# Patient Record
Sex: MALE | Race: OTHER | Employment: FULL TIME | URBAN - METROPOLITAN AREA
[De-identification: names, ages, dates, MRNs, and addresses within clinical notes are randomized per-mention and may not be internally consistent; named-entity substitution may affect disease eponyms.]

---

## 2022-05-28 ENCOUNTER — HOSPITAL ENCOUNTER (EMERGENCY)
Age: 52
Discharge: HOME | End: 2022-05-28
Payer: OTHER GOVERNMENT

## 2022-05-28 ENCOUNTER — APPOINTMENT (OUTPATIENT)
Dept: GENERAL RADIOLOGY | Age: 52
End: 2022-05-28
Payer: OTHER GOVERNMENT

## 2022-05-28 VITALS
OXYGEN SATURATION: 98 % | TEMPERATURE: 98.6 F | DIASTOLIC BLOOD PRESSURE: 80 MMHG | HEART RATE: 74 BPM | SYSTOLIC BLOOD PRESSURE: 149 MMHG

## 2022-05-28 DIAGNOSIS — S86.911A KNEE STRAIN, RIGHT, INITIAL ENCOUNTER: Primary | ICD-10-CM

## 2022-05-28 PROCEDURE — 73560 X-RAY EXAM OF KNEE 1 OR 2: CPT

## 2022-05-28 PROCEDURE — 99283 EMERGENCY DEPT VISIT LOW MDM: CPT

## 2022-05-28 RX ORDER — IBUPROFEN 600 MG/1
600 TABLET ORAL 3 TIMES DAILY PRN
Qty: 30 TABLET | Refills: 0 | Status: SHIPPED | OUTPATIENT
Start: 2022-05-28 | End: 2023-04-18

## 2022-05-28 ASSESSMENT — ENCOUNTER SYMPTOMS
SORE THROAT: 0
ABDOMINAL PAIN: 0
VOMITING: 0
BACK PAIN: 0
SHORTNESS OF BREATH: 0
NAUSEA: 0

## 2022-05-28 NOTE — LETTER
[facility] Emergency Department  [address] [place] [address]  Phone: [phone]         May 28, 2022     Patient: Felicia Flores   YOB: 1970   Date of Visit: 5/28/2022       To Whom It May Concern:    Brittany Phillips was seen and treated in our emergency department on 5/28/2022. Please excuse him from work on 5/27 and 5/28. The following work duties are recommended. He may return to work on 6/1/22 with the following restrictions: must wear splint/sling     Treatment and work recommendations given by the emergency department are initial emergency measures only, and follow up should be arranged as soon as possible with the company's occupational health provider* or the specialist to whom the worker was referred. *If the company does not have an occupational health provider, follow up should be at Chavez Davis MD  [address] [place] [address].   [address]    Schedule an appointment as soon as possible for a visit       [facility]  [address] [address],[address] [address]    Schedule an appointment as soon as possible for a visit             Sincerely,        Florentino Marrero PA-C        Signature:__________________________________

## 2022-05-28 NOTE — ED TRIAGE NOTES
Landry Castillo is a 54 y.o. male brought himself to the ER for eval of lower back pain and right knee pain following a fall off a truck at work last week. The patient states his pain is a 9/10 and its hard to walk on his right knee. The patient is alert and oriented with an open and patent with a normal respiratory effort.

## 2022-05-28 NOTE — ED NOTES
Pt refusing crutches at this time. ED tech at bedside to apply knee immobilizer.        Marck Pinto RN  05/28/22 0139

## 2022-05-28 NOTE — ED PROVIDER NOTES
[address]      Pt Name: Jorge Robles  MRN: [id number]  [place] 1970  Date of evaluation: 5/28/2022  Provider: Cristela Gomez PA-C    This patient was not seen and evaluated by the attending physician No att. providers found. CHIEF COMPLAINT      Chief Complaint: knee pain      HISTORYOF PRESENT ILLNESS  (Location/Symptom, Timing/Onset, Context/Setting, Quality, Duration, Modifying Factors, Severity.)   Brittany Good is a 54 y.o. male who presents to the emergency department complaining of knee pain. The patient reports about a week ago while at work he fell getting out of a truck. He says he twisted his right knee. Since then he has had right knee pain that radiates down his leg. Pain is constant and worse with weightbearing. He has been using ibuprofen and Tylenol with some improvement. Nursing Notes were reviewed and I agree. REVIEW OF SYSTEMS    (2-9 systems for level 4, 10 or more forlevel 5)     Review of Systems   Constitutional: Negative for chills and fever. HENT: Negative for sore throat. Respiratory: Negative for shortness of breath. Cardiovascular: Negative for chest pain. Gastrointestinal: Negative for abdominal pain, nausea and vomiting. Genitourinary: Negative for difficulty urinating and dysuria. Musculoskeletal: Positive for arthralgias. Negative for back pain. Skin: Negative for rash. Neurological: Negative for light-headedness and headaches. Psychiatric/Behavioral: The patient is not nervous/anxious. All other systems reviewed and are negative. Positives and Pertinent negatives as per HPI. Except as noted above the remainder of the review of systems was reviewed and negative. PAST MEDICALHISTORY   History reviewed. No pertinent past medical history. SURGICAL HISTORY     History reviewed. No pertinent surgical history.     CURRENT MEDICATIONS Previous Medications    No medications on file       ALLERGIES     Shrimp flavor and Shellfish-derived products    FAMILY HISTORY     History reviewed. No pertinent family history. No family status information on file. SOCIAL HISTORY    reports that he has never smoked. He has never used smokeless tobacco. He reports that he does not drink alcohol and does not use drugs. PHYSICAL EXAM    (up to 7 for level 4, 8 or more for level 5)     ED Triage Vitals [05/28/22 1550]   BP Temp Temp Source Heart Rate Resp SpO2 Height Weight   (!) 149/80 98.6 °F (37 °C) Oral 74 -- 98 % -- --       Physical Exam  Vitals and nursing note reviewed. Constitutional:       General: He is not in acute distress. Appearance: He is well-developed. HENT:      Head: Normocephalic and atraumatic. Pulmonary:      Effort: Pulmonary effort is normal. No respiratory distress. Musculoskeletal:         General: Normal range of motion. Cervical back: Neck supple. Comments: Patient has moderate diffuse tenderness about the right knee without any specific bony point tenderness. There is no effusion. No erythema or warmth. He has normal flexion but reduced extension secondary to pain. He has pain with any stress of the joint but no laxity. Skin:     General: Skin is warm and dry. Neurological:      Mental Status: He is alert and oriented to person, place, and time. Psychiatric:         Behavior: Behavior normal.            DIAGNOSTIC RESULTS     When ordered, EKGs are interpreted by the Emergency Department Physician in the absence of a cardiologist. Please see their note for interpretation of EKG    RADIOLOGY:         Interpretation per the Radiologist below, if available at the time of this note:    XR KNEE RIGHT (1-2 VIEWS)   Final Result   No acute finding of the right knee. LABS:  Labs Reviewed - No data to display    When ordered, only abnormal lab results are displayed.  All other labs are within normal range or not returned as of the dictation. EMERGENCY DEPARTMENT COURSE and DIFFERENTIAL DIAGNOSIS/MDM:   Vitals:    Vitals:    05/28/22 1550   BP: (!) 149/80   Pulse: 74   Temp: 98.6 °F (37 °C)   TempSrc: Oral   SpO2: 98%        I have evaluated this patient. My supervising physician was available for consultation. Patient is neurovascular intact. He is afebrile. His exam is reassuring and x-ray is negative. He was reassured. He was given knee immobilizer. He refused crutches. He was given orthopedic referral for outpatient management. He was also given a work excuse allowing him to perform light duty using knee immobilizer until further clearance from orthopedics. Discussed results, diagnosis and plan with patient and/or family. Questions addressed. Dispositionand follow-up agreed upon. Specific discharge instructions explained. The patient and/or family and I have discussed the diagnosis and risks, and we agree with discharging home to follow-up with their primary care,specialist or referral doctor. We also discussed returning to the Emergency Department immediately if new or worsening symptoms occur. We have discussed the symptoms which are most concerning that necessitate immediatereturn. PROCEDURES:  None    FINAL IMPRESSION      1. Knee strain, right, initial encounter          DISPOSITION/PLAN   DISPOSITION Discharge - Pending Orders Complete 05/28/2022 03:48:27 PM      PATIENT REFERRED TO:  Bud Claudio MD  [address] [facility].   [address] [place] [address]    Schedule an appointment as soon as possible for a visit       [facility]  [address]  [address] [address],[address] [place] [address]    Schedule an appointment as soon as possible for a visit         MEDICATIONS:  New Prescriptions    IBUPROFEN (ADVIL;MOTRIN) 600 MG TABLET    Take 1 tablet by mouth 3 times daily as needed for Pain       (Please note that portions of this note were completed with a voice recognition program.  Efforts were made toedit the dictations but occasionally words are mis-transcribed.)    LUIGI Hayes PA-C  05/28/22 8460

## 2022-05-28 NOTE — ED NOTES
Pt needed addendum to work note. MAX Dias with work note for pt. D/C: Order noted for d/c. Pt confirmed d/c paperwork  have correct name. Discharge and education instructions reviewed with patient. Teach-back successful. Pt verbalized understanding and signed d/c papers. Pt denied questions at this time. No acute distress noted. Patient instructed to follow-up as noted - return to emergency department if symptoms worsen. Patient verbalized understanding. Discharged per EDMD with discharge instructions. Pt discharged to private vehicle. Patient stable upon departure. Thanked patient for choosing [facility]) for care. Provider aware of patient pain at time of discharge.        Lorena Canas RN  05/28/22 0249

## 2022-09-20 ENCOUNTER — HOSPITAL ENCOUNTER (OUTPATIENT)
Dept: MRI IMAGING | Age: 52
Discharge: HOME | End: 2022-09-20
Payer: OTHER GOVERNMENT

## 2022-09-20 DIAGNOSIS — S83.91XA SPRAIN OF UNSPECIFIED SITE OF RIGHT KNEE, INITIAL ENCOUNTER: ICD-10-CM

## 2022-09-20 DIAGNOSIS — S39.012A STRAIN OF MUSCLE, FASCIA AND TENDON OF LOWER BACK, INITIAL ENCOUNTER: ICD-10-CM

## 2022-09-20 PROCEDURE — 73721 MRI JNT OF LWR EXTRE W/O DYE: CPT

## 2022-09-20 PROCEDURE — 72148 MRI LUMBAR SPINE W/O DYE: CPT

## 2022-11-21 ENCOUNTER — OFFICE VISIT (OUTPATIENT)
Dept: ORTHOPEDIC SURGERY | Age: 52
End: 2022-11-21

## 2022-11-21 VITALS — BODY MASS INDEX: 40.34 KG/M2 | WEIGHT: 251 LBS | RESPIRATION RATE: 16 BRPM | HEIGHT: 66 IN

## 2022-11-21 DIAGNOSIS — S83.231A COMPLEX TEAR OF MEDIAL MENISCUS OF RIGHT KNEE, UNSPECIFIED WHETHER OLD OR CURRENT TEAR, INITIAL ENCOUNTER: ICD-10-CM

## 2022-11-21 DIAGNOSIS — S83.91XA SPRAIN OF RIGHT KNEE, UNSPECIFIED LIGAMENT, INITIAL ENCOUNTER: Primary | ICD-10-CM

## 2022-11-21 NOTE — PROGRESS NOTES
CHIEF COMPLAINT: Right knee pain. DATE OF INJURY: 5/17/22    History:   Wesley Downing is a 50 y.o. male referred by Hardeep Barber MD for Sports Medicine consultation  for evaluation and treatment of right knee pain / injury. The patient complains of right knee pain. This is evaluated as a workers compensation injury. The pain began 6 months ago. Rate pain 7-9/10. There was a history of injury. He was getting out of a truck, his knee hit a rail and he fell onto the sidewalk. The pain is located anteromedial.Symptoms are worse with walking and standing. He has been working sedentary / clerical.   The knee has not given out or felt unstable. The patient can bend and straighten the knee fully. There is swelling in the knee. There was catching / locking of the knee. The patient has not had PT. The patient has not had an injection. The patient has taken NSAIDs. The patient has tried ice. The patient's occupation is  at [employer] No past medical history on file. No past surgical history on file. No family history on file. Social History     Socioeconomic History    Marital status: Single   Tobacco Use    Smoking status: Never    Smokeless tobacco: Never   Vaping Use    Vaping Use: Never used   Substance and Sexual Activity    Alcohol use: No     Comment: socially    Drug use: No    Sexual activity: Yes     Partners: Female   Social History Narrative    ** Merged History Encounter **            Current Outpatient Medications   Medication Sig Dispense Refill    ibuprofen (ADVIL;MOTRIN) 600 MG tablet Take 1 tablet by mouth 3 times daily as needed for Pain 30 tablet 0     No current facility-administered medications for this visit.        Allergies   Allergen Reactions    Shrimp Flavor Hives    Shellfish-Derived Products Rash     shrimp           Physical Examination:     Vital signs:   Resp 16   Ht 5' 6\" (1.676 m)   Wt 251 lb (113.9 kg)   BMI 40.51 kg/m²     General:  alert, appears stated age, cooperative, and no distress   Gait:  Antalgic. The patient can bear weight on the injured extremity. Right Knee  Alignment:  neutral   ROM:  0 degrees extension to 110 degrees flexion   Left knee: 0 degrees extension, 120 flexion   Crepitus:  no   Joint Tenderness:  globally   Effusion:   30 cc   Patellar excursion:  2 of 4 quadrants    Patellar tilt test:  negative   Patellar facet tenderness:  positive medial   positive lateral   Patellar apprehension test:  negative   Lachman test:  negative   Left knee: negative   Anterior drawer test:  negative   Left knee: negative   Posterior drawer:   negative    Left knee: negative   Varus laxity at 30 degrees:  negative   Left knee: negative   Valgus laxity at 30 degrees:   negative   Left knee: negative   Jes's test:  positive   Left knee: negative     There is not any cellulitis, lymphedema or cutaneous lesions noted in the lower extremities. Motor exam of the lower extremities show quadriceps, hamstrings, foot dorsiflexion and plantarflexion grossly intact. Sensation to both feet is grossly intact to light touch. The bilateral lower extremities are warm and well-perfused with brisk capillary refill. Imaging:  Right Knee X-Ray: 3 views obtained and reviewed. No fracture, dislocation, lytic lesion. Maintained joint spaces. Right Knee MRI: I independently reviewed the images, as well as the radiology report. Complex tear within the medial meniscus posterior horn. Assessment:     Right knee medial meniscus tear  Morbid obesity      Plan:     Natural history and expected course discussed. Questions answered. We reviewed the MRI images and discussed the findings. He does have mechanical symptoms. I had an extensive discussion with Mr. Byron Day and/or family regarding the natural history, etiology, and long term consequences of his condition.    I have presented reasonable alternatives to the patient's proposed care, treatment, and services. I have outlined a treatment plan with them and, in my opinion, surgical intervention is indicated at this time. Discussion I have had encompassed risks, benefits, and side effects related to the alternatives and the risks related to not receiving the proposed care, treatment, and services. I have discussed the potential complications (including, but not limited to injury to nerve or blood vessel, infection, bleeding, DVT or PE, stiffness, incomplete pain relief, need for further surgery, and anesthetic complications), limitations, expectations, alternatives, and risks of the surgical procedure. We also discussed the importance of postoperative rehabilitation. He has had full opportunity to ask his questions. I have answered them all to his satisfaction. I feel that Mr. Mirian Flood understands our discussion today and he will provide written informed consent for the procedure. Plan for right knee arthroscopy, partial medial menisectomy. The patient will see their PCP for H&P and clearance for surgery. We will limit him to sedentary / clerical restrictions. Meghna Lara. Antony Hamilton MD  Orthopaedic Surgery and Sports Medicine     Disclaimer: This note was generated with use of a verbal recognition program and an attempt was made to check for errors. It is possible that there are still dictated errors within this office note. If so, please bring any significant errors to my attention for an addendum. All efforts were made to ensure that this office note is accurate.

## 2022-11-21 NOTE — LETTER
[facility]  [address] [address]  Phone: [phone]  Fax: [phone]    Shantel Oh MD        November 21, 2022     Patient: Brenda Caldwell   YOB: 1970   Date of Visit: 11/21/2022       To Whom It May Concern: It is my medical opinion that Brenda Caldwell may return to work on 11/21/2022 with the following restrictions:    No squatting, pivoting, twisting, climbing   Clerical/sedentary duties    Restrictions in place for 8 weeks  If these restrictions are unable to be accommodated, the patient will need to be off work. If you have any questions or concerns, please don't hesitate to call.     Sincerely,    Shantel Oh MD

## 2022-11-21 NOTE — LETTER
Surgery Scheduling Form:    Patient Name:  April Delgado  Patient :  1970     Patient MR#:  [id number]    Patient Address:  [address] [place] [address]    Surgeon:  Aga Hoyt. Debbie Stahl M.D.    PCP:  No primary care provider on file. Insurance:  Payor: [address] [place] [address] / Plan: [address] [place] [address] / Product Type: *No Product type* /     Diagnosis:  Right knee medial meniscus tear S83.231A    Operation:  Right knee arthroscopy, partial medial menisectomy. [id number]     [facility]  Surgeon's Scheduling Instruction:  elective  Requested Date:     OR Time:       Patient Arrival Time:    OR Time Required:  60  Minutes    Anesthesia:  General  Surgical Assistant required:  Yes   Equipment:  none  Standard C-Arm:  No   Mini C-Arm:  No  Status:  Outpatient  PAT Required:  Yes    Comments:   History and Physical: Patient will obtain H+P from PCP prior to surgery  Covid: As of 3/23/2022: no test needed if symptom free              Aga Hoyt.  Debbie Stahl MD      22 7:01 PM EST     Workers' Compensation Request:  · PO Physical therapy

## 2022-11-22 ENCOUNTER — TELEPHONE (OUTPATIENT)
Dept: ORTHOPEDIC SURGERY | Age: 52
End: 2022-11-22

## 2022-11-22 NOTE — TELEPHONE ENCOUNTER
Following the patient's visit on 11/21/2022, Dr. Alfaro Kasigluk wishes to request the following for the patient:   Dx:   H17.353P - medial meniscus tear, RT  Surgery:   07307  PO Physical therapy    Detailed surgery letter completed and routed to:   Mook Mcgowan. Please advise clinical staff if further action is needed and/or when approval is received.

## 2023-04-17 ENCOUNTER — OFFICE VISIT (OUTPATIENT)
Dept: ORTHOPEDIC SURGERY | Age: 53
End: 2023-04-17

## 2023-04-17 VITALS — BODY MASS INDEX: 40.98 KG/M2 | WEIGHT: 255 LBS | HEIGHT: 66 IN

## 2023-04-17 DIAGNOSIS — S83.231A COMPLEX TEAR OF MEDIAL MENISCUS OF RIGHT KNEE, UNSPECIFIED WHETHER OLD OR CURRENT TEAR, INITIAL ENCOUNTER: Primary | ICD-10-CM

## 2023-04-17 RX ORDER — NAPROXEN 500 MG/1
500 TABLET ORAL 2 TIMES DAILY PRN
COMMUNITY
Start: 2023-02-01 | End: 2023-04-18 | Stop reason: SDUPTHER

## 2023-04-17 NOTE — PROGRESS NOTES
Current Outpatient Medications   Medication Sig Dispense Refill    naproxen (NAPROSYN) 500 MG tablet Take 1 tablet by mouth 2 times daily as needed      ibuprofen (ADVIL;MOTRIN) 600 MG tablet Take 1 tablet by mouth 3 times daily as needed for Pain (Patient not taking: Reported on 4/17/2023) 30 tablet 0     No current facility-administered medications for this visit. Allergies   Allergen Reactions    Shrimp Flavor Hives    Shellfish-Derived Products Rash     shrimp           Physical Examination:     Vital signs:   Ht 5' 6\" (1.676 m)   Wt 255 lb (115.7 kg)   BMI 41.16 kg/m²     General:  alert, appears stated age, cooperative, and no distress       Imaging:  Right Knee X-Ray: o fracture, dislocation, lytic lesion. Maintained joint spaces. Right Knee MRI:   Complex tear within the medial meniscus posterior horn. Assessment:     Right knee medial meniscus tear  Morbid obesity      Plan:     Awaiting [facility] approval for surgery. Plan is still for right knee arthroscopy, partial medial menisectomy. The patient will see their PCP for H&P and clearance for surgery. We will limit him to sedentary / clerical restrictions. No pitting, twisting, turning. [facility] form to be filled out for restrictions. Schedule surgery once approved. Rickie Bucio. Channing Gowers, MD  Orthopaedic Surgery and Sports Medicine     Disclaimer: This note was generated with use of a verbal recognition program and an attempt was made to check for errors. It is possible that there are still dictated errors within this office note. If so, please bring any significant errors to my attention for an addendum. All efforts were made to ensure that this office note is accurate.

## 2023-04-18 ENCOUNTER — TELEPHONE (OUTPATIENT)
Dept: ORTHOPEDIC SURGERY | Age: 53
End: 2023-04-18

## 2023-04-18 RX ORDER — NAPROXEN 500 MG/1
500 TABLET ORAL 2 TIMES DAILY PRN
Qty: 60 TABLET | Refills: 0 | Status: SHIPPED | OUTPATIENT
Start: 2023-04-18 | End: 2023-05-18

## 2023-04-18 NOTE — TELEPHONE ENCOUNTER
Called patient's number to relay information, gentleman who answered the phone refused to verify . Will attempt at a later time. S/W SIRO  He is requesting refill of Naproxen so that he does not have to pay for medication. Advised his request will be sent to the provider for review. Follow up in 60-90 days if surgery is not approved in that timeframe. Patient voiced understanding of the conversation and will contact the office with further questions or concerns.

## 2023-04-18 NOTE — TELEPHONE ENCOUNTER
No medication was prescribed. He can use NSAIDs and Tylenol as needed. Ice as needed. Could provide prescription for Meloxicam if he would like.

## 2023-06-27 ENCOUNTER — TELEPHONE (OUTPATIENT)
Dept: ORTHOPEDIC SURGERY | Age: 53
End: 2023-06-27

## 2023-06-27 ENCOUNTER — OFFICE VISIT (OUTPATIENT)
Dept: ORTHOPEDIC SURGERY | Age: 53
End: 2023-06-27

## 2023-06-27 VITALS — WEIGHT: 251.2 LBS | BODY MASS INDEX: 40.37 KG/M2 | HEIGHT: 66 IN | RESPIRATION RATE: 16 BRPM

## 2023-06-27 DIAGNOSIS — S83.231A COMPLEX TEAR OF MEDIAL MENISCUS OF RIGHT KNEE, UNSPECIFIED WHETHER OLD OR CURRENT TEAR, INITIAL ENCOUNTER: Primary | ICD-10-CM

## 2023-07-10 ENCOUNTER — TELEPHONE (OUTPATIENT)
Dept: INTERNAL MEDICINE CLINIC | Age: 53
End: 2023-07-10

## 2023-07-10 NOTE — TELEPHONE ENCOUNTER
Called patient to inform him that we do not do workers comp claims. Patient requested for the appt to be canceled.

## 2023-08-16 ENCOUNTER — TELEPHONE (OUTPATIENT)
Dept: ORTHOPEDIC SURGERY | Age: 53
End: 2023-08-16

## 2023-08-16 NOTE — TELEPHONE ENCOUNTER
General Question     Subject: July - [facility]  Patient and /or Facility Request: Codey Beckwithn  Contact Number: [phone]       IW REQUESTING CA17 COMPLETION. IW WAS TURNED AWAY FROM PRE-OP CLEARANCE APPT DUE TO WC STATUS FROM [facility] PCP. NEW APPT MADE FOR PRE-OP CLEARANCE 08.18.2023.      PLEASE ADVISE

## 2023-08-16 NOTE — TELEPHONE ENCOUNTER
He needs to be seen back in the office prior to a CA-17 being updated. We have not been informed that his surgery has been approved; unsure as to why he has a preop appointment scheduled at this time.

## 2023-08-18 ENCOUNTER — TELEPHONE (OUTPATIENT)
Dept: ORTHOPEDIC SURGERY | Age: 53
End: 2023-08-18

## 2023-08-18 NOTE — TELEPHONE ENCOUNTER
General Question     Subject: MAKING APPOINTMENT WITH PCP  Patient and /or Facility Request: YANETH  Contact Number: [phone]

## 2023-08-21 ENCOUNTER — TELEPHONE (OUTPATIENT)
Dept: ORTHOPEDIC SURGERY | Age: 53
End: 2023-08-21

## 2023-08-21 NOTE — TELEPHONE ENCOUNTER
General Question     Subject: CA17 completion & Sx clearance  Patient and /or Facility Request: Austin Babin  Contact Number: [phone]         IW calling about Sx clearance & CA17 completion w/updated work restrictions. Patient states he's following instructions for care per clinic visits. Please advise.

## 2023-08-21 NOTE — TELEPHONE ENCOUNTER
S/W O  He is requesting updated restrictions. He was reminded that a follow up appointment is needed in order to extend/alter his restrictions. Brittany was transferred to schedule a follow up appointment.

## 2023-08-21 NOTE — TELEPHONE ENCOUNTER
It was discussed with Brittany that his CA17 form will not be completed until his follow up visit. I have not received any surgery approval notification from the [facility] department. Please notify clinic if surgery approval has been received. Per chart review, the surgery request appears to still be pending with [facility] .

## 2023-08-22 ENCOUNTER — OFFICE VISIT (OUTPATIENT)
Dept: ORTHOPEDIC SURGERY | Age: 53
End: 2023-08-22

## 2023-08-22 VITALS — HEIGHT: 66 IN | WEIGHT: 251 LBS | BODY MASS INDEX: 40.34 KG/M2 | RESPIRATION RATE: 16 BRPM

## 2023-08-22 DIAGNOSIS — S83.231A COMPLEX TEAR OF MEDIAL MENISCUS OF RIGHT KNEE, UNSPECIFIED WHETHER OLD OR CURRENT TEAR, INITIAL ENCOUNTER: Primary | ICD-10-CM

## 2023-08-23 ENCOUNTER — TELEPHONE (OUTPATIENT)
Dept: ORTHOPEDIC SURGERY | Age: 53
End: 2023-08-23

## 2023-08-23 NOTE — TELEPHONE ENCOUNTER
Claim #  [id number]    Imported medical records Juan David Paz) all into [employer] for Darrel Dumont, [address]    No PT on file

## 2023-09-12 ENCOUNTER — TELEPHONE (OUTPATIENT)
Dept: ORTHOPEDIC SURGERY | Age: 53
End: 2023-09-12

## 2023-09-12 NOTE — TELEPHONE ENCOUNTER
Brittany's surgery has not been approved by [facility] at this time. Our office is not requesting a preop to be completed at this time as his surgery is not scheduled.

## 2023-09-12 NOTE — TELEPHONE ENCOUNTER
Tiffany at [address] [facility] w/ Fay Hampton office called. They are not sure why this patient came into the office, he said he is having surgery? They are not sure what's gong on? Please call Manuel parnell the [address] [place] [address] ask for [facility].

## 2023-09-19 ENCOUNTER — TELEPHONE (OUTPATIENT)
Dept: ORTHOPEDIC SURGERY | Age: 53
End: 2023-09-19

## 2023-09-19 NOTE — TELEPHONE ENCOUNTER
[facility] Duty Status Report updated and sent to [facility].

## 2023-09-19 NOTE — TELEPHONE ENCOUNTER
General Question     Subject: CA17 renewal  Patient and /or Facility Request: Yoav Sloan  Contact Number: [phone]     IW requesting new CA17 time off report or an extension to current CA17. IW states this is due to waiting for Sx auth and patient needing to submit remittance for Narrative report completed by Dr. Lucinda Phillips. Please advise.

## 2023-10-17 ENCOUNTER — PATIENT MESSAGE (OUTPATIENT)
Dept: ORTHOPEDIC SURGERY | Age: 53
End: 2023-10-17

## 2023-10-24 ENCOUNTER — OFFICE VISIT (OUTPATIENT)
Dept: ORTHOPEDIC SURGERY | Age: 53
End: 2023-10-24

## 2023-10-24 VITALS — RESPIRATION RATE: 16 BRPM | WEIGHT: 252.2 LBS | HEIGHT: 66 IN | BODY MASS INDEX: 40.53 KG/M2

## 2023-10-24 DIAGNOSIS — S83.231A COMPLEX TEAR OF MEDIAL MENISCUS OF RIGHT KNEE, UNSPECIFIED WHETHER OLD OR CURRENT TEAR, INITIAL ENCOUNTER: Primary | ICD-10-CM

## 2023-10-24 RX ORDER — NAPROXEN 500 MG/1
500 TABLET ORAL 2 TIMES DAILY PRN
Qty: 60 TABLET | Refills: 2 | Status: SHIPPED | OUTPATIENT
Start: 2023-10-24 | End: 2024-01-22

## 2023-10-24 NOTE — PROGRESS NOTES
CHIEF COMPLAINT: Right knee pain. DATE OF INJURY: 5/17/22    History:   Yashira De Leon is a 48 y.o. male here for right knee follow up. Initial history:  referred by Uche Gold MD for Sports Medicine consultation  for evaluation and treatment of right knee pain / injury. The patient complains of right knee pain. This is evaluated as a workers compensation injury. The pain began 6 months ago. Rate pain 7-9/10. There was a history of injury. He was getting out of a truck, his knee hit a rail and he fell onto the sidewalk. The pain is located anteromedial.Symptoms are worse with walking and standing. He has been working sedentary / clerical.   The knee has not given out or felt unstable. The patient can bend and straighten the knee fully. There is swelling in the knee. There was catching / locking of the knee. The patient has not had PT. The patient has not had an injection. The patient has taken NSAIDs. The patient has tried ice. The patient's occupation is  at [employer]. .    Interval History:  His knee is otherwise the same. He rates pain 8/10. Surgery still has not been approved by [facility]. We will update his CA 17 today. We will keep him on the same restrictions. He was provided the narrative. History reviewed. No pertinent past medical history.     Past Surgical History:   Procedure Laterality Date    KNEE ARTHROPLASTY         Family History   Problem Relation Age of Onset    No Known Problems Father        Social History     Socioeconomic History    Marital status: Single     Spouse name: None    Number of children: None    Years of education: None    Highest education level: None   Tobacco Use    Smoking status: Never    Smokeless tobacco: Never   Vaping Use    Vaping Use: Never used   Substance and Sexual Activity    Alcohol use: Yes     Comment: socially    Drug use: Yes     Types: Marijuana Adolm Sang)     Comment: Socially    Sexual activity: Yes     Partners: Female

## 2023-12-13 ENCOUNTER — TELEPHONE (OUTPATIENT)
Dept: ORTHOPEDIC SURGERY | Age: 53
End: 2023-12-13

## 2023-12-13 NOTE — TELEPHONE ENCOUNTER
S/w patient he is requesting an updated CA-17 extending to 1/2024. He asked if it could be placed in his mychart and he will try to access it from there.     Ph: [phone]

## 2023-12-14 NOTE — TELEPHONE ENCOUNTER
Form completed and sent to Brittany through [employer]. He was asked to submit this to his  due to personal request for extension.

## 2024-02-26 ENCOUNTER — OFFICE VISIT (OUTPATIENT)
Dept: ORTHOPEDIC SURGERY | Age: 54
End: 2024-02-26

## 2024-02-26 VITALS — BODY MASS INDEX: 41.14 KG/M2 | HEIGHT: 66 IN | RESPIRATION RATE: 16 BRPM | WEIGHT: 256 LBS

## 2024-02-26 DIAGNOSIS — S83.231A COMPLEX TEAR OF MEDIAL MENISCUS OF RIGHT KNEE, UNSPECIFIED WHETHER OLD OR CURRENT TEAR, INITIAL ENCOUNTER: Primary | ICD-10-CM

## 2024-02-26 NOTE — PROGRESS NOTES
CHIEF COMPLAINT: Right knee pain.    DATE OF INJURY: 5/17/22    History:   Brittany Echevarria is a 53 y.o. male here for right knee follow up.     Initial history:  referred by Chuckie Mora MD for Sports Medicine consultation  for evaluation and treatment of right knee pain / injury. The patient complains of right knee pain. This is evaluated as a workers compensation injury. The pain began 6 months ago.  Rate pain 7-9/10. There was a history of injury.  He was getting out of a truck, his knee hit a rail and he fell onto the sidewalk.  The pain is located anteromedial.Symptoms are worse with walking and standing. He has been working sedentary / clerical.   The knee has not given out or felt unstable. The patient can bend and straighten the knee fully. There is swelling in the knee. There was catching / locking of the knee. The patient has not had PT. The patient has not had an injection. The patient has taken NSAIDs. The patient has tried ice. The patient's occupation is  at [employer]    Interval History:  His knee is otherwise the same.  He rates pain 7/10.  He states that surgery was approved.  He states that he has the approval letter in his car.  He will go down and get that so that we can scanned that into his chart.      No past medical history on file.    Past Surgical History:   Procedure Laterality Date    KNEE ARTHROPLASTY         Family History   Problem Relation Age of Onset    No Known Problems Father        Social History     Socioeconomic History    Marital status: Single     Spouse name: None    Number of children: None    Years of education: None    Highest education level: None   Tobacco Use    Smoking status: Never    Smokeless tobacco: Never   Vaping Use    Vaping Use: Never used   Substance and Sexual Activity    Alcohol use: Yes     Comment: socially    Drug use: Yes     Types: Marijuana (Weed)     Comment: Socially    Sexual activity: Yes     Partners: Female   Social

## 2024-03-08 ENCOUNTER — TELEPHONE (OUTPATIENT)
Dept: ORTHOPEDIC SURGERY | Age: 54
End: 2024-03-08

## 2024-03-08 NOTE — TELEPHONE ENCOUNTER
S/W SIRO4   Approval for surgery has been received from [facility] and expires on 6/7/2024.     All pertinent appointments were scheduled with the patient, and pre procedure instructions were reviewed as well. The patient's updated surgery packet will be sent via [email or web address] portal per our discussion. Brittany Echevarria was asked to thoroughly review the packet and contact the office via phone or [employer] with any questions.   The patient was made aware the at this time, covid testing is not required, unless symptoms develop. Patient advised that should they develop any symptoms of sickness within 7-10 days of surgery date, they are to contact the office.     The patient was advised that a preop H+P will need to be completed with heir PCP within 30 days of their scheduled surgery date. Discussed alternatives for clearance should his PCP be unable to see him due to this being a WC claim.     Medication list was reviewed and updated with the patient. Advised to d/c Naproxen 7 days prior.     Patient voiced understanding of the conversation and will contact the office with further questions or concerns.

## 2024-03-11 ENCOUNTER — PREP FOR PROCEDURE (OUTPATIENT)
Dept: ORTHOPEDIC SURGERY | Age: 54
End: 2024-03-11

## 2024-03-11 VITALS — WEIGHT: 256 LBS | BODY MASS INDEX: 41.32 KG/M2

## 2024-03-11 PROBLEM — S83.231A COMPLEX TEAR OF MEDIAL MENISCUS OF RIGHT KNEE: Status: ACTIVE | Noted: 2024-03-11

## 2024-03-12 RX ORDER — SODIUM CHLORIDE 0.9 % (FLUSH) 0.9 %
5-40 SYRINGE (ML) INJECTION EVERY 12 HOURS SCHEDULED
Status: CANCELLED | OUTPATIENT
Start: 2024-03-28

## 2024-03-12 RX ORDER — SODIUM CHLORIDE 0.9 % (FLUSH) 0.9 %
5-40 SYRINGE (ML) INJECTION PRN
Status: CANCELLED | OUTPATIENT
Start: 2024-03-28

## 2024-03-12 RX ORDER — SODIUM CHLORIDE 9 MG/ML
INJECTION, SOLUTION INTRAVENOUS PRN
Status: CANCELLED | OUTPATIENT
Start: 2024-03-28

## 2024-03-18 ENCOUNTER — TELEPHONE (OUTPATIENT)
Dept: ORTHOPEDIC SURGERY | Age: 54
End: 2024-03-18

## 2024-03-18 NOTE — TELEPHONE ENCOUNTER
Siro is in need of preop h+p for clearance; labs and EKG are at PCP discretion.     Call placed, someone answered, asked if I could hold, then disconnected the call.

## 2024-03-22 ENCOUNTER — TELEPHONE (OUTPATIENT)
Dept: ORTHOPEDIC SURGERY | Age: 54
End: 2024-03-22

## 2024-03-22 NOTE — TELEPHONE ENCOUNTER
S/W Brittany Echevarria  Notified of surgery time for 3/28/2024:  Arrival Time: 10:30am  Surgery Time: 12:00pm  Surgery Location: [place] [address]    Reminded patient to bring the following to surgery:  crutches - if they already have them    Preop clearance has been received from:   [x] PCP

## 2024-03-26 NOTE — PROGRESS NOTES
Name_______________________________________Printed:____________________  Date and time of surgery___3/28/2024_________1200____________Arrival Time:___1030_____________   1. The instructions given regarding when and if a patient needs to stop oral intake prior to surgery varies.Follow the specific instructions you were given                  __x_Nothing to eat or to drink after Midnight the night before.                   ____Carbo loading or instructions will be given to select patients-if you have been given those instructions -please do the following                           The evening before your surgery after dinner before midnight drink 40 ounces of gatorade.If you are diabetic use sugar free.  The morning of surgery drink 40 ounces of water.This needs to be finished 3 hours prior to your surgery start time.    2. Take the following pills with a small sip of water on the morning of surgery___________________________________________________                  Do not take blood pressure medications ending in pril or sartan the belia prior to surgery or the morning of surgery. Dr Whitley's patient are not to take any medications the AM of surgery.         3. Aspirin, Ibuprofen, Advil, Naproxen, Vitamin E and other Anti-inflammatory products and supplements should be stopped for 5 -7days before surgery or as directed by your physician.   4. Check with your Doctor regarding stopping Plavix, Coumadin,Eliquis, Lovenox,Effient,Pradaxa,Xarelto, Fragmin or other blood thinners and follow their instructions.   5. Do not smoke, and do not drink any alcoholic beverages 24 hours prior to surgery.  This includes NA Beer.Refrain from the usage of any recreational drugs.   6. You may brush your teeth and gargle the morning of surgery.  DO NOT SWALLOW WATER   7. You MUST make arrangements for a responsible adult to stay on site while you are here and take you home after your surgery. You will not be allowed to leave alone or drive

## 2024-03-27 NOTE — DISCHARGE INSTRUCTIONS
Extra-strength Tylenol. You may also have had a prescription for an anti-inflammatory such as Celebrex or Naprosyn, and an anti-nausea medication such as Phenergan. Take the anti-inflammatory as scheduled with food, but take the narcotic and anti-nausea medication as needed.  Narcotic pain medications can cause constipation.  Make sure you are drinking adequate amounts of water.  Take fiber supplements as needed.  Consider using an over-the-counter stool softener and drinking prune juice.    EXERCISE: Exercises are extremely important following arthroscopic surgery to help you regain the motion, strength, and flexibility of your knee. That is why we try to get you into physical therapy as soon as possible after surgery. We encourage you to move your knee as normally as possible to help with the return of your flexibility and motion.    FOLLOW-UP: You should already have your first postoperative visit scheduled at the time your surgery is scheduled. If you do not, please call the office at [phone] to make the appointment. At the first visit, we will perform a wound check and go over the pictures from your surgery. At the second visit we will remove your stitches.        ANESTHESIA DISCHARGE INSTRUCTIONS    Wear your seatbelt home.  You are under the influence of drugs-do not drink alcohol, drive, operate machinery, make any important decisions or sign any legal documents for 24 hours.  A responsible adult needs to be with you for 24 hours.  You may experience lightheadedness, dizziness, or sleepiness following surgery.  Rest at home today- increase activity as tolerated.  Progress slowly to a regular diet unless your physician has instructed you otherwise. Drink plenty of water.  If persistent nausea and vomiting becomes a problem, call your physician.  Coughing, sore throat and muscle aches are other side effects of anesthesia, and should improve with time.  Do not drive or operate machinery while taking

## 2024-03-27 NOTE — H&P
Preoperative H&P Update    The patient's History and Physical in the medical record from 3/18/24 was reviewed by me today.  I reviewed the HPI, medications, allergies, reason for surgery, diagnosis and treatment plan and there has been no interval change.    The patient was examined by me today. Physical exam findings for this update include:    BP (!) 160/89   Pulse 72   Temp 98 °F (36.7 °C) (Temporal)   Resp 13   Ht 1.676 m (5' 6\")   Wt 112 kg (247 lb)   SpO2 100%   BMI 39.87 kg/m²   Airway is intact  Chest: breathing comfortably  Heart: regular rate  Findings on exam of the body region where surgery is to be performed include: see last office and/or consult note        Electronically signed by Lopez Sandoval MD on 3/28/2024 at 10:45 AM

## 2024-03-28 ENCOUNTER — HOSPITAL ENCOUNTER (OUTPATIENT)
Age: 54
Setting detail: OUTPATIENT SURGERY
Discharge: HOME | End: 2024-03-28
Attending: ORTHOPAEDIC SURGERY | Admitting: ORTHOPAEDIC SURGERY
Payer: OTHER GOVERNMENT

## 2024-03-28 ENCOUNTER — ANESTHESIA (OUTPATIENT)
Dept: OPERATING ROOM | Age: 54
End: 2024-03-28
Payer: OTHER GOVERNMENT

## 2024-03-28 ENCOUNTER — ANESTHESIA EVENT (OUTPATIENT)
Dept: OPERATING ROOM | Age: 54
End: 2024-03-28
Payer: OTHER GOVERNMENT

## 2024-03-28 VITALS
HEART RATE: 77 BPM | OXYGEN SATURATION: 98 % | HEIGHT: 66 IN | BODY MASS INDEX: 39.7 KG/M2 | RESPIRATION RATE: 29 BRPM | SYSTOLIC BLOOD PRESSURE: 139 MMHG | TEMPERATURE: 97.8 F | DIASTOLIC BLOOD PRESSURE: 85 MMHG | WEIGHT: 247 LBS

## 2024-03-28 DIAGNOSIS — S83.231A COMPLEX TEAR OF MEDIAL MENISCUS OF RIGHT KNEE AS CURRENT INJURY, INITIAL ENCOUNTER: Primary | ICD-10-CM

## 2024-03-28 PROCEDURE — 7100000010 HC PHASE II RECOVERY - FIRST 15 MIN: Performed by: ORTHOPAEDIC SURGERY

## 2024-03-28 PROCEDURE — 2580000003 HC RX 258: Performed by: ORTHOPAEDIC SURGERY

## 2024-03-28 PROCEDURE — 7100000011 HC PHASE II RECOVERY - ADDTL 15 MIN: Performed by: ORTHOPAEDIC SURGERY

## 2024-03-28 PROCEDURE — 7100000001 HC PACU RECOVERY - ADDTL 15 MIN: Performed by: ORTHOPAEDIC SURGERY

## 2024-03-28 PROCEDURE — 6360000002 HC RX W HCPCS: Performed by: ORTHOPAEDIC SURGERY

## 2024-03-28 PROCEDURE — 2709999900 HC NON-CHARGEABLE SUPPLY: Performed by: ORTHOPAEDIC SURGERY

## 2024-03-28 PROCEDURE — 3700000000 HC ANESTHESIA ATTENDED CARE: Performed by: ORTHOPAEDIC SURGERY

## 2024-03-28 PROCEDURE — 2500000003 HC RX 250 WO HCPCS: Performed by: NURSE ANESTHETIST, CERTIFIED REGISTERED

## 2024-03-28 PROCEDURE — 6370000000 HC RX 637 (ALT 250 FOR IP): Performed by: ORTHOPAEDIC SURGERY

## 2024-03-28 PROCEDURE — 6360000002 HC RX W HCPCS: Performed by: NURSE ANESTHETIST, CERTIFIED REGISTERED

## 2024-03-28 PROCEDURE — 7100000000 HC PACU RECOVERY - FIRST 15 MIN: Performed by: ORTHOPAEDIC SURGERY

## 2024-03-28 PROCEDURE — 3700000001 HC ADD 15 MINUTES (ANESTHESIA): Performed by: ORTHOPAEDIC SURGERY

## 2024-03-28 PROCEDURE — 3600000004 HC SURGERY LEVEL 4 BASE: Performed by: ORTHOPAEDIC SURGERY

## 2024-03-28 PROCEDURE — 3600000014 HC SURGERY LEVEL 4 ADDTL 15MIN: Performed by: ORTHOPAEDIC SURGERY

## 2024-03-28 RX ORDER — PHENYLEPHRINE HCL IN 0.9% NACL 1 MG/10 ML
SYRINGE (ML) INTRAVENOUS PRN
Status: DISCONTINUED | OUTPATIENT
Start: 2024-03-28 | End: 2024-03-28 | Stop reason: SDUPTHER

## 2024-03-28 RX ORDER — MAGNESIUM HYDROXIDE 1200 MG/15ML
LIQUID ORAL CONTINUOUS PRN
Status: COMPLETED | OUTPATIENT
Start: 2024-03-28 | End: 2024-03-28

## 2024-03-28 RX ORDER — LABETALOL HYDROCHLORIDE 5 MG/ML
10 INJECTION, SOLUTION INTRAVENOUS
Status: DISCONTINUED | OUTPATIENT
Start: 2024-03-28 | End: 2024-03-28 | Stop reason: HOSPADM

## 2024-03-28 RX ORDER — SODIUM CHLORIDE 0.9 % (FLUSH) 0.9 %
5-40 SYRINGE (ML) INJECTION EVERY 12 HOURS SCHEDULED
Status: DISCONTINUED | OUTPATIENT
Start: 2024-03-28 | End: 2024-03-28 | Stop reason: HOSPADM

## 2024-03-28 RX ORDER — EPHEDRINE SULFATE 50 MG/ML
INJECTION INTRAVENOUS PRN
Status: DISCONTINUED | OUTPATIENT
Start: 2024-03-28 | End: 2024-03-28 | Stop reason: SDUPTHER

## 2024-03-28 RX ORDER — ROPIVACAINE HYDROCHLORIDE 5 MG/ML
INJECTION, SOLUTION EPIDURAL; INFILTRATION; PERINEURAL
Status: COMPLETED | OUTPATIENT
Start: 2024-03-28 | End: 2024-03-28

## 2024-03-28 RX ORDER — POVIDONE-IODINE 10 MG/G
OINTMENT TOPICAL
Status: DISCONTINUED | OUTPATIENT
Start: 2024-03-28 | End: 2024-03-28 | Stop reason: ALTCHOICE

## 2024-03-28 RX ORDER — HYDROMORPHONE HYDROCHLORIDE 2 MG/ML
0.5 INJECTION, SOLUTION INTRAMUSCULAR; INTRAVENOUS; SUBCUTANEOUS EVERY 5 MIN PRN
Status: DISCONTINUED | OUTPATIENT
Start: 2024-03-28 | End: 2024-03-28 | Stop reason: HOSPADM

## 2024-03-28 RX ORDER — MIDAZOLAM HYDROCHLORIDE 1 MG/ML
INJECTION INTRAMUSCULAR; INTRAVENOUS PRN
Status: DISCONTINUED | OUTPATIENT
Start: 2024-03-28 | End: 2024-03-28 | Stop reason: SDUPTHER

## 2024-03-28 RX ORDER — OXYCODONE HYDROCHLORIDE 5 MG/1
5 TABLET ORAL
Status: DISCONTINUED | OUTPATIENT
Start: 2024-03-28 | End: 2024-03-28 | Stop reason: HOSPADM

## 2024-03-28 RX ORDER — SODIUM CHLORIDE 0.9 % (FLUSH) 0.9 %
5-40 SYRINGE (ML) INJECTION PRN
Status: DISCONTINUED | OUTPATIENT
Start: 2024-03-28 | End: 2024-03-28 | Stop reason: HOSPADM

## 2024-03-28 RX ORDER — SODIUM CHLORIDE 9 MG/ML
INJECTION, SOLUTION INTRAVENOUS PRN
Status: DISCONTINUED | OUTPATIENT
Start: 2024-03-28 | End: 2024-03-28 | Stop reason: HOSPADM

## 2024-03-28 RX ORDER — LIDOCAINE HYDROCHLORIDE 20 MG/ML
INJECTION, SOLUTION INFILTRATION; PERINEURAL PRN
Status: DISCONTINUED | OUTPATIENT
Start: 2024-03-28 | End: 2024-03-28 | Stop reason: SDUPTHER

## 2024-03-28 RX ORDER — HYDROCODONE BITARTRATE AND ACETAMINOPHEN 5; 325 MG/1; MG/1
1 TABLET ORAL EVERY 4 HOURS PRN
Qty: 30 TABLET | Refills: 0 | Status: SHIPPED | OUTPATIENT
Start: 2024-03-28 | End: 2024-04-04

## 2024-03-28 RX ORDER — ONDANSETRON 2 MG/ML
INJECTION INTRAMUSCULAR; INTRAVENOUS PRN
Status: DISCONTINUED | OUTPATIENT
Start: 2024-03-28 | End: 2024-03-28 | Stop reason: SDUPTHER

## 2024-03-28 RX ORDER — ONDANSETRON 2 MG/ML
4 INJECTION INTRAMUSCULAR; INTRAVENOUS
Status: DISCONTINUED | OUTPATIENT
Start: 2024-03-28 | End: 2024-03-28 | Stop reason: HOSPADM

## 2024-03-28 RX ORDER — PROCHLORPERAZINE EDISYLATE 5 MG/ML
5 INJECTION INTRAMUSCULAR; INTRAVENOUS
Status: DISCONTINUED | OUTPATIENT
Start: 2024-03-28 | End: 2024-03-28 | Stop reason: HOSPADM

## 2024-03-28 RX ORDER — DEXAMETHASONE SODIUM PHOSPHATE 4 MG/ML
INJECTION, SOLUTION INTRA-ARTICULAR; INTRALESIONAL; INTRAMUSCULAR; INTRAVENOUS; SOFT TISSUE PRN
Status: DISCONTINUED | OUTPATIENT
Start: 2024-03-28 | End: 2024-03-28 | Stop reason: SDUPTHER

## 2024-03-28 RX ORDER — HYDRALAZINE HYDROCHLORIDE 20 MG/ML
10 INJECTION INTRAMUSCULAR; INTRAVENOUS
Status: DISCONTINUED | OUTPATIENT
Start: 2024-03-28 | End: 2024-03-28 | Stop reason: HOSPADM

## 2024-03-28 RX ORDER — BACITRACIN ZINC AND POLYMYXIN B SULFATE 500; 1000 [USP'U]/G; [USP'U]/G
OINTMENT TOPICAL
Status: COMPLETED | OUTPATIENT
Start: 2024-03-28 | End: 2024-03-28

## 2024-03-28 RX ORDER — FENTANYL CITRATE 50 UG/ML
INJECTION, SOLUTION INTRAMUSCULAR; INTRAVENOUS PRN
Status: DISCONTINUED | OUTPATIENT
Start: 2024-03-28 | End: 2024-03-28 | Stop reason: SDUPTHER

## 2024-03-28 RX ORDER — LIDOCAINE HYDROCHLORIDE 10 MG/ML
1 INJECTION, SOLUTION EPIDURAL; INFILTRATION; INTRACAUDAL; PERINEURAL
Status: DISCONTINUED | OUTPATIENT
Start: 2024-03-28 | End: 2024-03-28 | Stop reason: HOSPADM

## 2024-03-28 RX ORDER — FENTANYL CITRATE 50 UG/ML
25 INJECTION, SOLUTION INTRAMUSCULAR; INTRAVENOUS EVERY 5 MIN PRN
Status: DISCONTINUED | OUTPATIENT
Start: 2024-03-28 | End: 2024-03-28 | Stop reason: HOSPADM

## 2024-03-28 RX ORDER — PROMETHAZINE HYDROCHLORIDE 25 MG/1
25 TABLET ORAL EVERY 6 HOURS PRN
Qty: 5 TABLET | Refills: 0 | Status: SHIPPED | OUTPATIENT
Start: 2024-03-28

## 2024-03-28 RX ORDER — PROPOFOL 10 MG/ML
INJECTION, EMULSION INTRAVENOUS PRN
Status: DISCONTINUED | OUTPATIENT
Start: 2024-03-28 | End: 2024-03-28 | Stop reason: SDUPTHER

## 2024-03-28 RX ORDER — SODIUM CHLORIDE, SODIUM LACTATE, POTASSIUM CHLORIDE, CALCIUM CHLORIDE 600; 310; 30; 20 MG/100ML; MG/100ML; MG/100ML; MG/100ML
INJECTION, SOLUTION INTRAVENOUS CONTINUOUS
Status: DISCONTINUED | OUTPATIENT
Start: 2024-03-28 | End: 2024-03-28 | Stop reason: HOSPADM

## 2024-03-28 RX ADMIN — CEFAZOLIN 2000 MG: 2 INJECTION, POWDER, FOR SOLUTION INTRAMUSCULAR; INTRAVENOUS at 11:05

## 2024-03-28 RX ADMIN — EPHEDRINE SULFATE 10 MG: 50 INJECTION, SOLUTION INTRAVENOUS at 11:32

## 2024-03-28 RX ADMIN — FENTANYL CITRATE 50 MCG: 50 INJECTION, SOLUTION INTRAMUSCULAR; INTRAVENOUS at 11:16

## 2024-03-28 RX ADMIN — Medication 100 MCG: at 11:20

## 2024-03-28 RX ADMIN — PROPOFOL 150 MG: 10 INJECTION, EMULSION INTRAVENOUS at 11:16

## 2024-03-28 RX ADMIN — LIDOCAINE HYDROCHLORIDE 100 MG: 20 INJECTION, SOLUTION INFILTRATION; PERINEURAL at 11:16

## 2024-03-28 RX ADMIN — EPHEDRINE SULFATE 10 MG: 50 INJECTION, SOLUTION INTRAVENOUS at 11:23

## 2024-03-28 RX ADMIN — FENTANYL CITRATE 50 MCG: 50 INJECTION, SOLUTION INTRAMUSCULAR; INTRAVENOUS at 11:06

## 2024-03-28 RX ADMIN — DEXAMETHASONE SODIUM PHOSPHATE 4 MG: 4 INJECTION, SOLUTION INTRAMUSCULAR; INTRAVENOUS at 11:20

## 2024-03-28 RX ADMIN — MIDAZOLAM 2 MG: 1 INJECTION INTRAMUSCULAR; INTRAVENOUS at 11:06

## 2024-03-28 RX ADMIN — ONDANSETRON 4 MG: 2 INJECTION INTRAMUSCULAR; INTRAVENOUS at 11:20

## 2024-03-28 RX ADMIN — SODIUM CHLORIDE: 9 INJECTION, SOLUTION INTRAVENOUS at 10:25

## 2024-03-28 RX ADMIN — EPHEDRINE SULFATE 10 MG: 50 INJECTION, SOLUTION INTRAVENOUS at 11:38

## 2024-03-28 ASSESSMENT — PAIN - FUNCTIONAL ASSESSMENT
PAIN_FUNCTIONAL_ASSESSMENT: NONE - DENIES PAIN
PAIN_FUNCTIONAL_ASSESSMENT: 0-10
PAIN_FUNCTIONAL_ASSESSMENT: NONE - DENIES PAIN

## 2024-03-28 ASSESSMENT — ENCOUNTER SYMPTOMS: SHORTNESS OF BREATH: 0

## 2024-03-28 NOTE — OP NOTE
Brittany Echevarria (1970)    3/28/2024    Preoperative Diagnosis-  Right knee medial meniscus tear    Postoperative Diagnosis-  Right knee medial and lateral meniscus tears    Procedure-  Right knee diagnostic arthroscopy, partial medial and lateral meniscectomies      Surgeon-  Lopez Sandoval MD    Assistant(s)-Radha Marin    Anesthesia- General    EBL-  minimal    Complications-  none    Disposition-  To PACU in stable condition      Indications for Procedure  The patient was seen in the office for right knee pain.  She was seen as a Workmen's Compensation injury.  He was getting out of his truck and his knee hit a rail and he fell.  MRI of his knee demonstrated a complex tear within the posterior horn of the medial meniscus.  Patient continued to have pain in his knee for over 2 years until his surgery was approved by [facility].  We discussed both nonoperative and operative treatment options, with operative treatment recommended  We discussed the risks, benefits, and complications to the procedure as well as alternatives and the risks related to not receiving the proposed care, treatment.   The patient subsequently provided written informed consent for the procedure.    Site Marking and Surgical Prep  The patient was seen in the holding area and the operative extremity was marked.  Patient was seen by the anesthesia service. The patient was then brought to the operating room, identified, transferred onto the operating room table in the supine position.  The patient was then induced under general anesthesia.  The patient received prophylactic preoperative IV antibiotics and had DVT prophylaxis with sequential compression device on the nonoperative lower extremity. The operative extremity was then sterilely prepped and draped in the usual fashion.  A timeout was taken with the patient, the operative extremity, and operative procedure were once again verified.    Diagnostic Arthroscopy  The knee was

## 2024-03-28 NOTE — PROGRESS NOTES
Pt arrived from OR to PACU bay 10. Reported received from OR RN/CRNA staff. Pt  arousable to voice. Surgical incisions dressings in place to right leg. Pt on RA, ST, and VSS. Will continue to monitor.

## 2024-03-28 NOTE — PROGRESS NOTES
Teaching/ education completed for home care including pain management, wound care,activity,safety precautions and infection control. Patient verbalized understanding.

## 2024-03-28 NOTE — ANESTHESIA POSTPROCEDURE EVALUATION
Department of Anesthesiology  Postprocedure Note    Patient: Brittany Echevarria  MRN: [id number]  YOB: 1970  Date of evaluation: 3/28/2024    Procedure Summary       Date: 03/28/24 Room / Location: [address]    Anesthesia Start: 1106 Anesthesia Stop: 1157    Procedure: RIGHT KNEE ARTHROSCOPY, PARTIAL MEDIAL MENISCECTOMY (Right: Knee) Diagnosis:       Complex tear of medial meniscus of right knee      (Complex tear of medial meniscus of right knee [S83.231A])    Surgeons: Lopez Sandoval MD Responsible Provider: Domenica Romero MD    Anesthesia Type: general ASA Status: 2            Anesthesia Type: No value filed.    Carrie Phase I: Carrie Score: 10    Carrie Phase II:      Anesthesia Post Evaluation    Patient location during evaluation: PACU  Patient participation: complete - patient participated  Level of consciousness: awake and alert  Airway patency: patent  Nausea & Vomiting: no nausea and no vomiting  Cardiovascular status: hemodynamically stable  Respiratory status: acceptable  Hydration status: stable  Multimodal analgesia pain management approach  Pain management: adequate    No notable events documented.

## 2024-03-28 NOTE — PROGRESS NOTES
Discharge instructions review with patient. All home medications have been reviewed, pt v/u. Pt discharged via wheelchair. Pt discharged with crutches, filled RX and all belongings. Son taking stable pt home.

## 2024-03-28 NOTE — ANESTHESIA PRE PROCEDURE
Department of Anesthesiology  Preprocedure Note       Name:  Brittany Echevarria   Age:  53 y.o.  :  1970                                          MRN:  [id number]         Date:  3/28/2024      Surgeon: Surgeon(s):  Lopez Sandoval MD    Procedure: Procedure(s):  RIGHT KNEE ARTHROSCOPY, PARTIAL MEDIAL MENISCECTOMY    Medications prior to admission:   Prior to Admission medications    Medication Sig Start Date End Date Taking? Authorizing Provider   naproxen (NAPROSYN) 500 MG tablet Take 1 tablet by mouth 2 times daily as needed for Pain 10/24/23 1/22/24  Lopez Sandoval MD   sildenafil (VIAGRA) 100 MG tablet Take 1 tablet by mouth daily as needed for Erectile Dysfunction 23   Tara Galicia PA       Current medications:    Current Facility-Administered Medications   Medication Dose Route Frequency Provider Last Rate Last Admin    sodium chloride flush 0.9 % injection 5-40 mL  5-40 mL IntraVENous 2 times per day Lopez Sandoval MD        sodium chloride flush 0.9 % injection 5-40 mL  5-40 mL IntraVENous PRN Lopez Sandoval MD        0.9 % sodium chloride infusion   IntraVENous PRN Lopez Sandoval MD        ceFAZolin Sodium (ANCEF) 3,000 mg in sodium chloride 0.9 % 100 mL (mini-bag)  3,000 mg IntraVENous On Call to OR Lopez Sandoval MD           Allergies:    Allergies   Allergen Reactions    Shrimp Flavor Hives    Shellfish-Derived Products Rash     shrimp       Problem List:    Patient Active Problem List   Diagnosis Code    Complex tear of medial meniscus of right knee S83.231A       Past Medical History:  History reviewed. No pertinent past medical history.    Past Surgical History:        Procedure Laterality Date    KNEE ARTHROPLASTY Left        Social History:    Social History     Tobacco Use    Smoking status: Never    Smokeless tobacco: Never   Substance Use Topics    Alcohol use: Yes     Comment: socially                                Counseling given: Not Answered      Vital Signs

## 2024-03-28 NOTE — BRIEF OP NOTE
Brief Postoperative Note      Patient: Brittany Echevarria  YOB: 1970  MRN: [id number]    Date of Procedure: 3/28/2024    Pre-Op Diagnosis Codes:     * Complex tear of medial meniscus of right knee [S83.231A]    Post-Op Diagnosis: Right knee medial and lateral meniscus tears       Procedure(s):  RIGHT KNEE ARTHROSCOPY, PARTIAL MEDIAL AND LATERAL MENISCECTOMY    Surgeon(s):  Lopez Sandoval MD    Assistant:  First Assistant: Radha Marin    Anesthesia: General    Estimated Blood Loss (mL): Minimal    Complications: None          Electronically signed by Lopez Sandoval MD on 3/28/2024 at 11:43 AM

## 2024-04-01 ENCOUNTER — TELEPHONE (OUTPATIENT)
Dept: ORTHOPEDIC SURGERY | Age: 54
End: 2024-04-01

## 2024-04-01 NOTE — TELEPHONE ENCOUNTER
RODRIGO/MAGDALENA WOLFE  Reached out to Brittany regarding missed 9:45am post operative appointment today. Requested he call the office to schedule an appointment with [facility] or [facility] tomorrow.     [employer] message has been as well.

## 2024-04-01 NOTE — TELEPHONE ENCOUNTER
RODRIGO/MAGDALENA WOLFE  Reached out to Brittany regarding missed 9:45am post operative appointment today. Requested he call the office to schedule an appointment with [facility] today or tomorrow.

## 2024-04-02 ENCOUNTER — TELEPHONE (OUTPATIENT)
Dept: ORTHOPEDIC SURGERY | Age: 54
End: 2024-04-02

## 2024-04-02 ENCOUNTER — OFFICE VISIT (OUTPATIENT)
Dept: ORTHOPEDIC SURGERY | Age: 54
End: 2024-04-02

## 2024-04-02 VITALS — HEIGHT: 66 IN | BODY MASS INDEX: 39.7 KG/M2 | WEIGHT: 247 LBS

## 2024-04-02 DIAGNOSIS — S83.91XA SPRAIN OF RIGHT KNEE, UNSPECIFIED LIGAMENT, INITIAL ENCOUNTER: ICD-10-CM

## 2024-04-02 DIAGNOSIS — S83.231A COMPLEX TEAR OF MEDIAL MENISCUS OF RIGHT KNEE, UNSPECIFIED WHETHER OLD OR CURRENT TEAR, INITIAL ENCOUNTER: Primary | ICD-10-CM

## 2024-04-02 PROCEDURE — 99024 POSTOP FOLLOW-UP VISIT: CPT | Performed by: ORTHOPAEDIC SURGERY

## 2024-04-02 NOTE — TELEPHONE ENCOUNTER
PATIENT REQUESTING CALL:    NAMAN patient asking if he may remove bandaging from right knee surgery done 3/28/2024.  Ph: [phone]

## 2024-04-02 NOTE — PROGRESS NOTES
Knee Arthroscopy Follow-up  Brittany Echevarria is here for follow up after right knee arthroscopic surgery. Surgery date was 3/28/24. Findings at surgery: medial meniscus tear, lateral meniscus tear. Pain is controlled with current analgesics.  Medication(s) being used: Norco. The patient's pain is rated at 8/10. The patient denies fever, wound drainage, increasing redness, pus, increasing swelling. Post op problems reported: none. He is ambulating with single crutch.       Physical Examination:  Ht 1.676 m (5' 6\")   Wt 112 kg (247 lb)   BMI 39.87 kg/m²    Patient is awake, alert, and in no acute distress.  The incisions are clean, dry, no drainage. There is no warmth, erythema, or purulent drainage over the incisions.          Assessment:   5 days status post right knee arthroscopy, partial medial and lateral meniscectomies      Plan:   We reviewed intra-operative arthroscopy photos.    Start physical therapy. A physical therapy order was placed and postoperative physical therapy protocol was provided to the patient to give to the physical therapist.    The patient may advance their weight-bearing as tolerated.    The patient should use the cold pad or apply ice to the knee continuously for 3 days after surgery. Then use cold pad or ice 20-30 minutes only, 3-5 times per day as needed.    Refill pain medications as needed.    Discussed taking NSAID continuously with food for the next two weeks.  Afterwards, take prn pain.  The patient was advised that NSAID-type medications have several potential side effects that include: gastrointestinal irritation including hemorrhage, renal injury, as well as an increased risk for heart attack and stroke. The patient was asked to take the medication with food and to stop if there is any symptoms of GI upset, including heartburn, nausea, increased gas or diarrhea. I asked the patient to contact their medical provider for vomiting, abdominal pain or black/bloody stools. The

## 2024-04-02 NOTE — TELEPHONE ENCOUNTER
S/W SIRO  Scheduled appointment per previous documented phone calls due to missed appt yesterday.    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

## 2024-04-12 ENCOUNTER — HOSPITAL ENCOUNTER (OUTPATIENT)
Dept: PHYSICAL THERAPY | Age: 54
Setting detail: THERAPIES SERIES
Discharge: HOME | End: 2024-04-12
Attending: ORTHOPAEDIC SURGERY
Payer: OTHER GOVERNMENT

## 2024-04-12 DIAGNOSIS — M25.60 LIMITED JOINT RANGE OF MOTION (ROM): ICD-10-CM

## 2024-04-12 DIAGNOSIS — R60.9 SWELLING: ICD-10-CM

## 2024-04-12 DIAGNOSIS — R68.89 DECREASED EXERCISE TOLERANCE: ICD-10-CM

## 2024-04-12 DIAGNOSIS — R52 PAIN: Primary | ICD-10-CM

## 2024-04-12 DIAGNOSIS — R68.89 DECREASED ABILITY TO PERFORM ACTIVITIES: ICD-10-CM

## 2024-04-12 DIAGNOSIS — Z56.89 DIFFICULTY PERFORMING WORK ACTIVITIES: ICD-10-CM

## 2024-04-12 DIAGNOSIS — Z78.9 NEED FOR HOME EXERCISE PROGRAM: ICD-10-CM

## 2024-04-12 DIAGNOSIS — G47.9 DIFFICULTY SLEEPING: ICD-10-CM

## 2024-04-12 DIAGNOSIS — R68.89 DECREASED ACTIVITY TOLERANCE: ICD-10-CM

## 2024-04-12 DIAGNOSIS — R53.1 FUNCTIONAL WEAKNESS: ICD-10-CM

## 2024-04-12 DIAGNOSIS — R26.9 GAIT DIFFICULTY: ICD-10-CM

## 2024-04-12 PROCEDURE — 97162 PT EVAL MOD COMPLEX 30 MIN: CPT

## 2024-04-12 PROCEDURE — 97110 THERAPEUTIC EXERCISES: CPT

## 2024-04-12 PROCEDURE — 97140 MANUAL THERAPY 1/> REGIONS: CPT

## 2024-04-12 NOTE — PLAN OF CARE
Tests:  Neg homans    Gait:    Pattern: antalgic pattern and stiff knee gait w/ cane on same side. Recommended he use a crutch on L arm, but he declined and did not follow recommendations.    Assistive Device Used: Single point cane on right    Balance:  [x] WNL      [] NT       [] Dysfunction noted  Comment:     Falls Risk Assessment (30 days):   Falls Risk assessed and no intervention required.  Time Up and Go (TUG):   Not Assessed        Exercises/Interventions     Therapeutic Ex (42427)  Resistance Sets/ Reps/ Time Completed Notes/Cues/Progressions   cardio       AAROM/ AROM Seated/ supine  x Attempted each for several minutes          Heel/ toe raise   x Pt edu   Quad sets   x Pt edu          Hip abd   >    Hip ext   >                                Pt Edu   x Pathology, role of PT, prognosis, expectations, time frame for recovery, HEP review, PNE+, mgmt of CRPS and desensitization training   Manual Intervention (38333)  TIME     Knee manual  15' x Light STM/ desensitization per pt carmine, patellar mobs, knee PROM                               NMR re-education (42060) Resistance Sets/ Reps/ Time Completed  CUES NEEDED                                                    Therapeutic Activity (43400)  Sets/time                       Modalities:    No modalities applied this session    Education/Home Exercise Program: Patient HEP program created electronically.  Refer to [facility] access code: 4/12: seated knee AAROM, desensitization self massage        ASSESSMENT   Assessment:   Brittany Echevarria is a 53 y.o. male presenting today to Outpatient PT with signs and symptoms consistent with severely limited knee ROM and strength secondary to chronic injury and recent surgery..    Pt. presents with the functional impairments and activity limitations listed below and would benefit from Outpatient PT to address the below impairments as well as improve pain, and restore function.     Functional Impairments:   Noted

## 2024-04-12 NOTE — FLOWSHEET NOTE
04/12/2024     Note: Portions of this note have been templated and/or copied from initial evaluation, reassessments and prior notes for documentation efficiency.    Note: If patient does not return for scheduled/recommended follow up visits, this note will serve as a discharge from care along with the most recent update on progress.

## 2024-04-16 ENCOUNTER — OFFICE VISIT (OUTPATIENT)
Dept: ORTHOPEDIC SURGERY | Age: 54
End: 2024-04-16

## 2024-04-16 ENCOUNTER — TELEPHONE (OUTPATIENT)
Dept: ORTHOPEDIC SURGERY | Age: 54
End: 2024-04-16

## 2024-04-16 VITALS — BODY MASS INDEX: 40.34 KG/M2 | RESPIRATION RATE: 16 BRPM | HEIGHT: 66 IN | WEIGHT: 251 LBS

## 2024-04-16 DIAGNOSIS — S83.231A COMPLEX TEAR OF MEDIAL MENISCUS OF RIGHT KNEE, UNSPECIFIED WHETHER OLD OR CURRENT TEAR, INITIAL ENCOUNTER: Primary | ICD-10-CM

## 2024-04-16 NOTE — PROGRESS NOTES
Knee Arthroscopy Follow-up  Brittany Echevarria is here for follow up after right knee arthroscopic surgery. Surgery date was 3/28/24. Findings at surgery: medial meniscus tear, lateral meniscus tear. Pain is controlled with current analgesics.  Medication(s) being used: Norco. The patient's pain is rated at 8/10.  He did not start PT until yesterday.  They believe he is developing complex regional pain syndrome as he has severe sensitive to palpation.      Physical Examination:  Resp 16   Ht 1.676 m (5' 6\")   Wt 113.9 kg (251 lb)   BMI 40.51 kg/m²    Patient is awake, alert, and in no acute distress.  He is using a cane.  The incisions are healing.  On my exam, today he has no significant hypersensitivity or even tenderness to palpation of the skin.  I am able to passively fully extend his knee to 0 degrees.        Assessment:   2 weeks status post right knee arthroscopy, partial medial and lateral meniscectomies      Plan:   No obvious signs of complex regional pain syndrome today.    Sutures were removed.  Steri-strips and mastisol were applied.    Patient may submerge incision under water at 4 weeks after surgery.    Continue physical therapy.    Refill pain medications as needed.    OTC NSAIDs as needed.    He is a .  We will keep him out of work and provide letter for 2 months.  Discussed that we would likely have to progressively increase his activity.    Return to office at the 6 week postop time period for evaluation of progress or prn if problems.          Lopez Sandoval MD  Orthopaedic Surgery and Sports Medicine       This note was generated with use of a verbal recognition program and was checked for errors.  It is possible that there are still dictated errors within this office note.  If so, please bring any errors to my attention for an addendum.  All efforts were made to ensure that this office note is accurate.

## 2024-04-17 ENCOUNTER — HOSPITAL ENCOUNTER (OUTPATIENT)
Dept: PHYSICAL THERAPY | Age: 54
Setting detail: THERAPIES SERIES
Discharge: HOME | End: 2024-04-17
Attending: ORTHOPAEDIC SURGERY
Payer: OTHER GOVERNMENT

## 2024-04-17 PROCEDURE — 97110 THERAPEUTIC EXERCISES: CPT

## 2024-04-17 PROCEDURE — 97140 MANUAL THERAPY 1/> REGIONS: CPT

## 2024-04-17 NOTE — FLOWSHEET NOTE
walking, bending, lifting, catching, throwing, pushing, pulling, jumping.)  Direct, one on one contact, billed in 15-minute increments.  (81544) MANUAL THERAPY -  Manual therapy techniques, 1 or more regions, each 15 minutes (Mobilization/manipulation, manual lymphatic drainage, manual traction) for the purpose of modulating pain, promoting relaxation,  increasing ROM, reducing/eliminating soft tissue swelling/inflammation/restriction, improving soft tissue extensibility and allowing for proper ROM for normal function with self care, mobility, lifting and ambulation  (21952) GAIT RE-EDUCATION - Provided training and instruction to the patient for proper joint and muscle recruitment and positioning and eccentric body weight control with ambulation re-education including up and down stairs. Therapeutic procedure, one or more areas, each 15 minutes;   (37891) VASOPNEUMATIC      GOALS     Patient stated goal: able to return to work/ hobbies w/o limitation.  Status: [] Progressing: [] Met: [] Not Met: [] Adjusted    Therapist goals for Patient:   Short Term Goals: To be achieved in: 2 weeks  Independent in HEP and progression per patient tolerance, in order to progress toward full function and prevent re-injury.    Status: [] Progressing: [] Met: [] Not Met: [] Adjusted  Patient will have a decrease in pain to 3/10 to help facilitate improvement in movement, function, and ADLs as indicated by functional deficits.   Status: [] Progressing: [] Met: [] Not Met: [] Adjusted    Long Term Goals: To be achieved in: 8-10 weeks  Disability index score of 15% or less for the WOMAC to assist with return top prior level of function.   Status: [] Progressing: [] Met: [] Not Met: [] Adjusted  Improve ROM to WNL to allow for proper joint functioning as indicated by patients functional deficits.  Status: [] Progressing: [] Met: [] Not Met: [] Adjusted  Pt to improve strength to 4+/5 or better of proximal hip, quadriceps, gastroc/soleus,

## 2024-04-22 ENCOUNTER — HOSPITAL ENCOUNTER (OUTPATIENT)
Dept: PHYSICAL THERAPY | Age: 54
Setting detail: THERAPIES SERIES
Discharge: HOME | End: 2024-04-22
Attending: ORTHOPAEDIC SURGERY
Payer: OTHER GOVERNMENT

## 2024-04-22 PROCEDURE — 97140 MANUAL THERAPY 1/> REGIONS: CPT

## 2024-04-22 PROCEDURE — 97110 THERAPEUTIC EXERCISES: CPT

## 2024-04-22 NOTE — FLOWSHEET NOTE
has kept his knee mostly straight and avoided most activity since initial injury. He did not have any PT prior to surgery. He enters today using a cane on same side, essentially stiff leg walking, and states he is very afraid to move his knee. Incisions are covered and no excessive redness or drainage noted.     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 He has not done his route in many months; limited to sedentary work.    SUBJECTIVE EXAMINATION     04/17/24: Patient reports knee mobility is a little better,still pretty sore.States doing ok with HEP and has been icing at home.  4/22: pt reports doing ok. Amb w/ stiff knee gait w/ cane.        Test used Initial score  4/12/24 04/17/2024 4/22   Pain Summary VAS 8/10 7/10    Functional questionnaire WOMAC 38%     Other:       ROM    L: 0-120*  R: 0-85* (best)       Other Co-morbidities not listed:       OBJECTIVE EXAMINATION     4/12/24  ROM/Strength: (Blank cells denote NT)      Mvmt (norm) AROM L AROM R Notes PROM L PROM R Notes               LUMBAR Flex (90)         Ext (25)         SB (25)          Rotation (30)                       HIP Flex (120)          Abd (45)          ER (50)          IR (45)          Ext (20)                   KNEE Flex (140) WNL ~15* w/pain        Ext (0) 0 0                   ANKLE DF (20)          PF (50)          Inversion (30)          Eversion (20)             MMT L          MMT  R Notes     LUMBAR  Flexion       Extension       Lateral flexion        Rotation          MMT L MMT R Notes       HIP  Flexion        Abduction        ER        IR        Extension             KNEE  Flexion        Extension               ANKLE  DF        PF        Inversion        Eversion        Repeated Movements: [] Normal  [] Abnormal [] N/A    Palpation:   Extreme sensitivity to light touch throughout RLE  Location:    Posture:   RLE held stiff during entire evaluation    Bandages/Dressings/Incisions:  Patients wound/incision not visible or unable to

## 2024-04-25 ENCOUNTER — HOSPITAL ENCOUNTER (OUTPATIENT)
Dept: PHYSICAL THERAPY | Age: 54
Setting detail: THERAPIES SERIES
End: 2024-04-25
Attending: ORTHOPAEDIC SURGERY
Payer: OTHER GOVERNMENT

## 2024-04-30 ENCOUNTER — HOSPITAL ENCOUNTER (OUTPATIENT)
Dept: PHYSICAL THERAPY | Age: 54
Setting detail: THERAPIES SERIES
Discharge: HOME | End: 2024-04-30
Attending: ORTHOPAEDIC SURGERY
Payer: OTHER GOVERNMENT

## 2024-04-30 PROCEDURE — 97140 MANUAL THERAPY 1/> REGIONS: CPT

## 2024-04-30 PROCEDURE — 97110 THERAPEUTIC EXERCISES: CPT

## 2024-04-30 NOTE — FLOWSHEET NOTE
Progressing: [] Met: [] Not Met: [] Adjusted  Improve ROM to WNL to allow for proper joint functioning as indicated by patients functional deficits.  Status: [] Progressing: [] Met: [] Not Met: [] Adjusted  Pt to improve strength to 4+/5 or better of proximal hip, quadriceps, gastroc/soleus, and hamstrings to allow for proper muscle and joint use in functional mobility, ADLs and prior level of function   Status: [] Progressing: [] Met: [] Not Met: [] Adjusted  Patient will return to  Usual work, housework or activities, Usual recreational activities, heavy home activities, walk 2 blocks, and up/down 1 flight of stairs  without increased symptoms or restriction to work towards return to prior level of function.        Status: [] Progressing: [] Met: [] Not Met: [] Adjusted  Patient will increase LE function to return to work with regular duties.   Patient will resume normal work/leisure activities without pain.            Status: [] Progressing: [] Met: [] Not Met: [] Adjusted    Overall Progression Towards Functional goals/ Treatment Progress Update:  [] Patient is progressing as expected towards functional goals listed.    [x] Progression is slowed due to complexities/Impairments listed.  [] Progression has been slowed due to co-morbidities.  [] Plan just implemented, too soon (<30days) to assess goals progression   [] Goals require adjustment due to lack of progress  [] Patient is not progressing as expected and requires additional follow up with physician  [] Other:     TREATMENT PLAN     Frequency/Duration: 2-3x/week for 8-10 weeks for the following treatment interventions:    Interventions:  Therapeutic Exercise (30746) including: strength training, ROM, and functional mobility  Therapeutic Activities (63112) including: functional mobility training and education.  Neuromuscular Re-education (12595) activation and proprioception, including postural re-education.    Gait Training (25002) for normalization of

## 2024-05-03 ENCOUNTER — HOSPITAL ENCOUNTER (OUTPATIENT)
Dept: PHYSICAL THERAPY | Age: 54
Setting detail: THERAPIES SERIES
End: 2024-05-03
Attending: ORTHOPAEDIC SURGERY
Payer: OTHER GOVERNMENT

## 2024-05-07 ENCOUNTER — HOSPITAL ENCOUNTER (OUTPATIENT)
Dept: PHYSICAL THERAPY | Age: 54
Setting detail: THERAPIES SERIES
Discharge: HOME | End: 2024-05-07
Attending: ORTHOPAEDIC SURGERY
Payer: OTHER GOVERNMENT

## 2024-05-07 PROCEDURE — 97140 MANUAL THERAPY 1/> REGIONS: CPT

## 2024-05-07 PROCEDURE — 97110 THERAPEUTIC EXERCISES: CPT

## 2024-05-07 NOTE — FLOWSHEET NOTE
on exercise progression, improving proper muscle recruitment and activation/motor control patterns, modulating pain, promoting relaxation, increasing ROM, reduce/eliminate soft tissue swelling/inflammation/restriction, improving soft tissue extensibility, allowing for proper ROM, static and dynamic balance, and kinesthetic sense and proprioception. Next visit plan to progress weights, progress reps, add new exercises, and progress balance     Electronically Signed by Johnathon Jorge, TEJINDER  Date: 05/07/2024     Note: Portions of this note have been templated and/or copied from initial evaluation, reassessments and prior notes for documentation efficiency.    Note: If patient does not return for scheduled/recommended follow up visits, this note will serve as a discharge from care along with the most recent update on progress.

## 2024-05-09 ENCOUNTER — HOSPITAL ENCOUNTER (OUTPATIENT)
Dept: PHYSICAL THERAPY | Age: 54
Setting detail: THERAPIES SERIES
Discharge: HOME | End: 2024-05-09
Attending: ORTHOPAEDIC SURGERY
Payer: OTHER GOVERNMENT

## 2024-05-09 PROCEDURE — 97140 MANUAL THERAPY 1/> REGIONS: CPT

## 2024-05-09 PROCEDURE — 97110 THERAPEUTIC EXERCISES: CPT

## 2024-05-09 NOTE — FLOWSHEET NOTE
muscular strength or increase flexibility, following either an injury or surgery.   (64908) HOME EXERCISE PROGRAM - Reviewed/Progressed HEP activities related to strengthening, flexibility, endurance, ROM performed to prevent loss of range of motion, maintain or improve muscular strength or increase flexibility, following either an injury or surgery.  (27272) MANUAL THERAPY -  Manual therapy techniques, 1 or more regions, each 15 minutes (Mobilization/manipulation, manual lymphatic drainage, manual traction) for the purpose of modulating pain, promoting relaxation,  increasing ROM, reducing/eliminating soft tissue swelling/inflammation/restriction, improving soft tissue extensibility and allowing for proper ROM for normal function with self care, mobility, lifting and ambulation      GOALS     Patient stated goal: able to return to work/ hobbies w/o limitation.  Status: [] Progressing: [] Met: [] Not Met: [] Adjusted    Therapist goals for Patient:   Short Term Goals: To be achieved in: 2 weeks  Independent in HEP and progression per patient tolerance, in order to progress toward full function and prevent re-injury.    Status: [] Progressing: [] Met: [] Not Met: [] Adjusted  Patient will have a decrease in pain to 3/10 to help facilitate improvement in movement, function, and ADLs as indicated by functional deficits.   Status: [] Progressing: [] Met: [] Not Met: [] Adjusted    Long Term Goals: To be achieved in: 8-10 weeks  Disability index score of 15% or less for the WOMAC to assist with return top prior level of function.   Status: [] Progressing: [] Met: [] Not Met: [] Adjusted  Improve ROM to WNL to allow for proper joint functioning as indicated by patients functional deficits.  Status: [] Progressing: [] Met: [] Not Met: [] Adjusted  Pt to improve strength to 4+/5 or better of proximal hip, quadriceps, gastroc/soleus, and hamstrings to allow for proper muscle and joint use in functional mobility, ADLs and

## 2024-05-14 ENCOUNTER — HOSPITAL ENCOUNTER (OUTPATIENT)
Dept: PHYSICAL THERAPY | Age: 54
Setting detail: THERAPIES SERIES
End: 2024-05-14
Attending: ORTHOPAEDIC SURGERY
Payer: OTHER GOVERNMENT

## 2024-05-17 ENCOUNTER — HOSPITAL ENCOUNTER (OUTPATIENT)
Dept: PHYSICAL THERAPY | Age: 54
Setting detail: THERAPIES SERIES
Discharge: HOME | End: 2024-05-17
Attending: ORTHOPAEDIC SURGERY
Payer: OTHER GOVERNMENT

## 2024-05-17 PROCEDURE — 97110 THERAPEUTIC EXERCISES: CPT

## 2024-05-17 NOTE — FLOWSHEET NOTE
w/pain        Ext (0) 0 0                   ANKLE DF (20)          PF (50)          Inversion (30)          Eversion (20)             MMT L          MMT  R Notes     LUMBAR  Flexion       Extension       Lateral flexion        Rotation          MMT L MMT R Notes       HIP  Flexion        Abduction        ER        IR        Extension             KNEE  Flexion        Extension               ANKLE  DF        PF        Inversion        Eversion        Repeated Movements: [] Normal  [] Abnormal [] N/A    Palpation:   Extreme sensitivity to light touch throughout RLE  Location:    Posture:   RLE held stiff during entire evaluation    Bandages/Dressings/Incisions:  Patients wound/incision not visible or unable to be assessed at this time.    Dermatomes: Abnormal findings listed below  NT    Myotomes: Abnormal findings listed below  NT    Reflexes: Abnormal findings listed below  Not tested    Specific Joint Mobility Testing/Accessory Motions:      Knee: hypomobile     Special Tests:  Neg homans    Gait:    Pattern: antalgic pattern and stiff knee gait w/ cane on same side. Recommended he use a crutch on L arm, but he declined and did not follow recommendations.    Assistive Device Used: Single point cane on right    Balance:  [x] WNL      [] NT       [] Dysfunction noted  Comment:     Falls Risk Assessment (30 days):   Falls Risk assessed and no intervention required.  Time Up and Go (TUG):   Not Assessed           Exercises/Interventions     Therapeutic Ex (60710)  Resistance Sets/ Reps/ Time Completed Notes/Cues/Progressions   Cardio bike L1 5 min @ start    5min @ end X     Arcs; strong encouragement for full revolution in near future   AAROM/ AROM Seated    supine heel slide w/ belt    Supine w/ SB HS curl 30sec x 8    X15      x12 >    X      >pt refused today Strong encouragement for best effort each rep   SLR x 3 way (flex, abd, ext)  X20 ea x Quite fatiguing per pt report   Prone knee flex str  10 s x 15     LAQ

## 2024-05-21 ENCOUNTER — HOSPITAL ENCOUNTER (OUTPATIENT)
Dept: PHYSICAL THERAPY | Age: 54
Setting detail: THERAPIES SERIES
Discharge: HOME | End: 2024-05-21
Attending: ORTHOPAEDIC SURGERY
Payer: OTHER GOVERNMENT

## 2024-05-21 ENCOUNTER — OFFICE VISIT (OUTPATIENT)
Dept: ORTHOPEDIC SURGERY | Age: 54
End: 2024-05-21

## 2024-05-21 VITALS — HEIGHT: 66 IN | WEIGHT: 253 LBS | BODY MASS INDEX: 40.66 KG/M2

## 2024-05-21 DIAGNOSIS — S83.231A COMPLEX TEAR OF MEDIAL MENISCUS OF RIGHT KNEE, UNSPECIFIED WHETHER OLD OR CURRENT TEAR, INITIAL ENCOUNTER: Primary | ICD-10-CM

## 2024-05-21 PROCEDURE — 97110 THERAPEUTIC EXERCISES: CPT

## 2024-05-21 PROCEDURE — 97140 MANUAL THERAPY 1/> REGIONS: CPT

## 2024-05-21 NOTE — FLOWSHEET NOTE
[facility]- Outpatient Rehabilitation and Therapy [address], [address], [place] [address] office: [phone] fax: [phone]     \    Physical Therapy: TREATMENT/PROGRESS NOTE   Patient: Brittany Echevarria (53 y.o. male)   Examination Date: 2024   :  1970 MRN: [id number]   Visit #: 8   Insurance Allowable Auth Needed   [facility]   20 visits  to  []Yes    []No    Insurance: Payor: [employer] OWCP / Plan: [employer] OF [employer] OWCP / Product Type: *No Product type* /   Insurance ID: [id number] - (Worker's Comp)  Secondary Insurance (if applicable):    Treatment Diagnosis:   No diagnosis found.     Medical Diagnosis:  Complex tear of medial meniscus of right knee, unspecified whether old or current tear, initial encounter [S83.231A]  Sprain of right knee, unspecified ligament, initial encounter [S83.91XA]   Referring Physician: Lopez Sandoval MD  PCP: Tara Galicia PA     Plan of care signed (Y/N):     Date of Patient follow up with Physician:      Progress Report/POC: NO  POC update due: (10 visits /OR AUTH LIMITS, whichever is less)  5/10/2024                                             Precautions/ Contra-indications:           Latex allergy:  NO  Pacemaker:    NO  Contraindications for Manipulation: None  Date of Surgery: 3/28/24  Other:    Red Flags:  SEVERE SENSITIVITY TO PALPATION; POSSIBLE CRPS    C-SSRS Triggered by Intake questionnaire:   [x] No, Questionnaire did not trigger screening.   [] Yes, Patient intake triggered further evaluation      [] C-SSRS Screening completed  [] PCP notified via Plan of Care  [] Emergency services notified     Preferred Language for Healthcare:   [x] English       [] other:  Patient stated complaint: Pt here for evaluation R knee s/p knee scope on 3/28/24. He injured his knee apparently a year or more ago at work when he fell onto R knee. He was on restrictions to sedentary work only and kept having issues

## 2024-05-24 ENCOUNTER — HOSPITAL ENCOUNTER (OUTPATIENT)
Dept: PHYSICAL THERAPY | Age: 54
Setting detail: THERAPIES SERIES
Discharge: HOME | End: 2024-05-24
Attending: ORTHOPAEDIC SURGERY
Payer: OTHER GOVERNMENT

## 2024-05-24 PROCEDURE — 97140 MANUAL THERAPY 1/> REGIONS: CPT

## 2024-05-24 PROCEDURE — 97110 THERAPEUTIC EXERCISES: CPT

## 2024-05-28 ENCOUNTER — HOSPITAL ENCOUNTER (OUTPATIENT)
Dept: PHYSICAL THERAPY | Age: 54
Setting detail: THERAPIES SERIES
Discharge: HOME | End: 2024-05-28
Attending: ORTHOPAEDIC SURGERY
Payer: OTHER GOVERNMENT

## 2024-05-28 PROCEDURE — 97110 THERAPEUTIC EXERCISES: CPT

## 2024-05-28 PROCEDURE — 97140 MANUAL THERAPY 1/> REGIONS: CPT

## 2024-05-28 NOTE — FLOWSHEET NOTE
swelling/inflammation/restriction, improving soft tissue extensibility and allowing for proper ROM for normal function with self care, mobility, lifting and ambulation      GOALS     Patient stated goal: able to return to work/ hobbies w/o limitation.  Status: [x] Progressing: [] Met: [] Not Met: [] Adjusted    Therapist goals for Patient:   Short Term Goals: To be achieved in: 2 weeks  Independent in HEP and progression per patient tolerance, in order to progress toward full function and prevent re-injury.    Status: [x] Progressing: [] Met: [] Not Met: [] Adjusted  Patient will have a decrease in pain to 3/10 to help facilitate improvement in movement, function, and ADLs as indicated by functional deficits.   Status: [x] Progressing: [] Met: [] Not Met: [] Adjusted    Long Term Goals: To be achieved in: 8-10 weeks  Disability index score of 15% or less for the WOMAC to assist with return top prior level of function.   Status: [x] Progressing: [] Met: [] Not Met: [] Adjusted  Improve ROM to WNL to allow for proper joint functioning as indicated by patients functional deficits.  Status: [x] Progressing: [] Met: [] Not Met: [] Adjusted  Pt to improve strength to 4+/5 or better of proximal hip, quadriceps, gastroc/soleus, and hamstrings to allow for proper muscle and joint use in functional mobility, ADLs and prior level of function   Status: [x] Progressing: [] Met: [] Not Met: [] Adjusted  Patient will return to  Usual work, housework or activities, Usual recreational activities, heavy home activities, walk 2 blocks, and up/down 1 flight of stairs  without increased symptoms or restriction to work towards return to prior level of function.        Status: [x] Progressing: [] Met: [] Not Met: [] Adjusted  Patient will increase LE function to return to work with regular duties.   Patient will resume normal work/leisure activities without pain.            Status: [x] Progressing: [] Met: [x] Not Met: []

## 2024-05-31 ENCOUNTER — HOSPITAL ENCOUNTER (OUTPATIENT)
Dept: PHYSICAL THERAPY | Age: 54
Setting detail: THERAPIES SERIES
Discharge: HOME | End: 2024-05-31
Attending: ORTHOPAEDIC SURGERY
Payer: OTHER GOVERNMENT

## 2024-05-31 PROCEDURE — 97110 THERAPEUTIC EXERCISES: CPT

## 2024-05-31 PROCEDURE — 97140 MANUAL THERAPY 1/> REGIONS: CPT

## 2024-05-31 NOTE — FLOWSHEET NOTE
Therapeutic Activity (22563)  Sets/time                       Modalities:    No modalities applied this session    Education/Home Exercise Program: Patient HEP program created electronically.  Refer to [facility] access code: 4/12: seated knee AAROM, desensitization self massage  4/22: seated ROM, heel slides, LAQ, mini squat, heel/ toe raise, standing marching      ASSESSMENT     Today's Assessment: Pt carmine session well. He cont's to show progress of late with improved activity tolerance to clinic activities. He does get fatigued with more advanced ex's such as slide lunges, but at least he is able to tolerate them better.     Pt needs skilled PT to restore mobility and function and allow him to return to work as a .     Medical Necessity Documentation:  I certify that this patient meets the below criteria necessary for medical necessity for care and/or justification of therapy services:  The patient has functional impairments and/or activity limitations and would benefit from continued outpatient therapy services to address the deficits outlined in the patients goals  The patient has a musculoskeletal condition(s) with a corresponding ICD-10 code that is of complexity and severity that require skilled therapeutic intervention. This has a direct and significant impact on the need for therapy and significantly impacts the rate of recovery.   The patient has a complexity identified by an ICD-10 code that has a direct and significant impact on the need for therapy.  (Significantly impacts the rate of recovery and is associated with a primary condition.)   The patient has generalized musculoskeletal conditions or a condition affecting multiple sites that will have a direct impact on the rate of recovery  The patient has associated co-morbidities along with primary diagnosis which significantly impact the rate of recovery and contribute to complexities that require skilled therapeutic

## 2024-06-04 ENCOUNTER — HOSPITAL ENCOUNTER (OUTPATIENT)
Dept: PHYSICAL THERAPY | Age: 54
Setting detail: THERAPIES SERIES
Discharge: HOME | End: 2024-06-04
Attending: ORTHOPAEDIC SURGERY
Payer: OTHER GOVERNMENT

## 2024-06-04 PROCEDURE — 97110 THERAPEUTIC EXERCISES: CPT

## 2024-06-04 NOTE — FLOWSHEET NOTE
Patient intake triggered further evaluation      [] C-SSRS Screening completed  [] PCP notified via Plan of Care  [] Emergency services notified     Preferred Language for Healthcare:   [x] English       [] other:  Patient stated complaint: Pt here for evaluation R knee s/p knee scope on 3/28/24. He injured his knee apparently a year or more ago at work when he fell onto R knee. He was on restrictions to sedentary work only and kept having issues getting surgery approved. He has kept his knee mostly straight and avoided most activity since initial injury. He did not have any PT prior to surgery. He enters today using a cane on same side, essentially stiff leg walking, and states he is very afraid to move his knee. Incisions are covered and no excessive redness or drainage noted.     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 He has not done his route in many months; limited to sedentary work.    Used to walk 12-13 miles a day.     Date of surgery 3/28/24   2 wks     4 wks    6 wks 5/9   8 wks 5/23   10 wks 6/6   12 wks 6/20   14 wks    16 wks          SUBJECTIVE EXAMINATION     04/17/24: Patient reports knee mobility is a little better,still pretty sore.States doing ok with HEP and has been icing at home.  4/22: pt reports doing ok. Amb w/ stiff knee gait w/ cane.   4/30: Pt states he feels he is improving. Pain still 7/10, and still walks with very stiff knee gait.   5/7: pt states he feels a lot of pain today. Started yesterday (Monday) w/o reason. He states he tries to do HEP, but he struggles to push himself because it is painful to bend his knee too far.   5/9: states he is doing ok. Still amb w/ cane on same side with mild limp and stiff knee gait.   5/17: no new c/o.   5/21: no new c/o. Still amb w/ stiff knee gait.   5/24: pt reports no new c/o. When asked what seems to be improving with PT; he laughs and says he cannot say anything is getting easier. States ortho will see him again in 2 months, and likely

## 2024-06-07 ENCOUNTER — APPOINTMENT (OUTPATIENT)
Dept: PHYSICAL THERAPY | Age: 54
End: 2024-06-07
Attending: ORTHOPAEDIC SURGERY
Payer: OTHER GOVERNMENT

## 2024-06-11 ENCOUNTER — HOSPITAL ENCOUNTER (OUTPATIENT)
Dept: PHYSICAL THERAPY | Age: 54
Setting detail: THERAPIES SERIES
Discharge: HOME | End: 2024-06-11
Attending: ORTHOPAEDIC SURGERY
Payer: OTHER GOVERNMENT

## 2024-06-11 PROCEDURE — 97110 THERAPEUTIC EXERCISES: CPT

## 2024-06-11 NOTE — FLOWSHEET NOTE
Next visit plan to progress weights, progress reps, add new exercises, and progress balance     Electronically Signed by Johnathon Jorge, PT  Date: 06/11/2024     Note: Portions of this note have been templated and/or copied from initial evaluation, reassessments and prior notes for documentation efficiency.    Note: If patient does not return for scheduled/recommended follow up visits, this note will serve as a discharge from care along with the most recent update on progress.

## 2024-06-14 ENCOUNTER — HOSPITAL ENCOUNTER (OUTPATIENT)
Dept: PHYSICAL THERAPY | Age: 54
Setting detail: THERAPIES SERIES
Discharge: HOME | End: 2024-06-14
Attending: ORTHOPAEDIC SURGERY
Payer: OTHER GOVERNMENT

## 2024-06-14 PROCEDURE — 97110 THERAPEUTIC EXERCISES: CPT

## 2024-06-14 NOTE — FLOWSHEET NOTE
[facility]- Outpatient Rehabilitation and Therapy [address], [address], [place] [address] office: [phone] fax: [phone]     \    Physical Therapy: TREATMENT/PROGRESS NOTE   Patient: Brittany Echevarria (53 y.o. male)   Examination Date: 2024   :  1970 MRN: [id number]   Visit #: 14   Insurance Allowable Auth Needed   [facility]   20 visits  to  []Yes    []No    Insurance: Payor: [employer] OWCP / Plan: [employer] OWCP / Product Type: *No Product type* /   Insurance ID: [id number] - (Worker's Comp)  Secondary Insurance (if applicable):    Treatment Diagnosis:   No diagnosis found.    1. Pain  R52         2. Decreased activity tolerance  R68.89         3. Gait difficulty  R26.9         4. Swelling  R60.9         5. Difficulty sleeping  G47.9         6. Difficulty performing work activities  Z56.89         7. Decreased exercise tolerance  R68.89         8. Limited joint range of motion (ROM)  M25.60         9. Functional weakness  R53.1         10. Decreased ability to perform activities  R68.89         11. Need for home exercise program  Z78.9        Medical Diagnosis:  Complex tear of medial meniscus of right knee, unspecified whether old or current tear, initial encounter [S83.231A]  Sprain of right knee, unspecified ligament, initial encounter [S83.91XA]   Referring Physician: Lopez Sandoval MD  PCP: Tara Galicia PA     Plan of care signed (Y/N):     Date of Patient follow up with Physician:      Progress Report/POC: NO  POC update due: (10 visits /OR AUTH LIMITS, whichever is less)  5/10/2024                                             Precautions/ Contra-indications:           Latex allergy:  NO  Pacemaker:    NO  Contraindications for Manipulation: None  Date of Surgery: 3/28/24  Other:    Red Flags:  High SENSITIVITY TO PALPATION    C-SSRS Triggered by Intake questionnaire:   [x] No, Questionnaire did not trigger screening.   [] Yes,

## 2024-06-18 ENCOUNTER — HOSPITAL ENCOUNTER (OUTPATIENT)
Dept: PHYSICAL THERAPY | Age: 54
Setting detail: THERAPIES SERIES
Discharge: HOME | End: 2024-06-18
Attending: ORTHOPAEDIC SURGERY
Payer: OTHER GOVERNMENT

## 2024-06-18 PROCEDURE — 97110 THERAPEUTIC EXERCISES: CPT

## 2024-06-18 PROCEDURE — 97140 MANUAL THERAPY 1/> REGIONS: CPT

## 2024-06-18 NOTE — FLOWSHEET NOTE
Met: [] Not Met: [] Adjusted    Long Term Goals: To be achieved in: 8-10 weeks  Disability index score of 15% or less for the WOMAC to assist with return top prior level of function.   Status: [x] Progressing: [] Met: [] Not Met: [] Adjusted  Improve ROM to WNL to allow for proper joint functioning as indicated by patients functional deficits.  Status: [x] Progressing: [] Met: [] Not Met: [] Adjusted  Pt to improve strength to 4+/5 or better of proximal hip, quadriceps, gastroc/soleus, and hamstrings to allow for proper muscle and joint use in functional mobility, ADLs and prior level of function   Status: [x] Progressing: [] Met: [] Not Met: [] Adjusted  Patient will return to  Usual work, housework or activities, Usual recreational activities, heavy home activities, walk 2 blocks, and up/down 1 flight of stairs  without increased symptoms or restriction to work towards return to prior level of function.        Status: [x] Progressing: [] Met: [] Not Met: [] Adjusted  Patient will increase LE function to return to work with regular duties.   Patient will resume normal work/leisure activities without pain.            Status: [x] Progressing: [] Met: [x] Not Met: [] Adjusted    Overall Progression Towards Functional goals/ Treatment Progress Update:  [x] Patient is progressing as expected towards functional goals listed.    [] Progression is slowed due to complexities/Impairments listed.  [] Progression has been slowed due to co-morbidities.  [] Plan just implemented, too soon (<30days) to assess goals progression   [] Goals require adjustment due to lack of progress  [] Patient is not progressing as expected and requires additional follow up with physician  [] Other:     TREATMENT PLAN     Frequency/Duration: 2-3x/week for 8-10 weeks for the following treatment interventions:    Interventions:  Therapeutic Exercise (63540) including: strength training, ROM, and functional mobility  Therapeutic Activities (25539)

## 2024-06-21 ENCOUNTER — HOSPITAL ENCOUNTER (OUTPATIENT)
Dept: PHYSICAL THERAPY | Age: 54
Setting detail: THERAPIES SERIES
Discharge: HOME | End: 2024-06-21
Attending: ORTHOPAEDIC SURGERY
Payer: OTHER GOVERNMENT

## 2024-06-21 PROCEDURE — 97110 THERAPEUTIC EXERCISES: CPT

## 2024-06-21 NOTE — FLOWSHEET NOTE
to WNL to allow for proper joint functioning as indicated by patients functional deficits.  Status: [x] Progressing: [] Met: [] Not Met: [] Adjusted  Pt to improve strength to 4+/5 or better of proximal hip, quadriceps, gastroc/soleus, and hamstrings to allow for proper muscle and joint use in functional mobility, ADLs and prior level of function   Status: [x] Progressing: [] Met: [] Not Met: [] Adjusted  Patient will return to  Usual work, housework or activities, Usual recreational activities, heavy home activities, walk 2 blocks, and up/down 1 flight of stairs  without increased symptoms or restriction to work towards return to prior level of function.        Status: [x] Progressing: [] Met: [] Not Met: [] Adjusted  Patient will increase LE function to return to work with regular duties.   Patient will resume normal work/leisure activities without pain.            Status: [x] Progressing: [] Met: [x] Not Met: [] Adjusted    Overall Progression Towards Functional goals/ Treatment Progress Update:  [x] Patient is progressing as expected towards functional goals listed.    [] Progression is slowed due to complexities/Impairments listed.  [] Progression has been slowed due to co-morbidities.  [] Plan just implemented, too soon (<30days) to assess goals progression   [] Goals require adjustment due to lack of progress  [] Patient is not progressing as expected and requires additional follow up with physician  [] Other:     TREATMENT PLAN     Frequency/Duration: 2-3x/week for 8-10 weeks for the following treatment interventions:    Interventions:  Therapeutic Exercise (82139) including: strength training, ROM, and functional mobility  Therapeutic Activities (89901) including: functional mobility training and education.  Neuromuscular Re-education (71622) activation and proprioception, including postural re-education.    Gait Training (95715) for normalization of ambulation patterns and AD training.   Manual Therapy

## 2024-06-25 ENCOUNTER — APPOINTMENT (OUTPATIENT)
Dept: PHYSICAL THERAPY | Age: 54
End: 2024-06-25
Attending: ORTHOPAEDIC SURGERY
Payer: OTHER GOVERNMENT

## 2024-06-28 ENCOUNTER — HOSPITAL ENCOUNTER (OUTPATIENT)
Dept: PHYSICAL THERAPY | Age: 54
Setting detail: THERAPIES SERIES
Discharge: HOME | End: 2024-06-28
Attending: ORTHOPAEDIC SURGERY
Payer: OTHER GOVERNMENT

## 2024-06-28 PROCEDURE — 97110 THERAPEUTIC EXERCISES: CPT

## 2024-06-28 PROCEDURE — 97140 MANUAL THERAPY 1/> REGIONS: CPT

## 2024-06-28 NOTE — FLOWSHEET NOTE
Note: Portions of this note have been templated and/or copied from initial evaluation, reassessments and prior notes for documentation efficiency.    Note: If patient does not return for scheduled/recommended follow up visits, this note will serve as a discharge from care along with the most recent update on progress.

## 2024-07-02 ENCOUNTER — HOSPITAL ENCOUNTER (OUTPATIENT)
Dept: PHYSICAL THERAPY | Age: 54
Setting detail: THERAPIES SERIES
Discharge: HOME | End: 2024-07-02
Attending: ORTHOPAEDIC SURGERY
Payer: OTHER GOVERNMENT

## 2024-07-02 PROCEDURE — 97110 THERAPEUTIC EXERCISES: CPT

## 2024-07-02 NOTE — FLOWSHEET NOTE
[facility]- Outpatient Rehabilitation and Therapy [address], [address], [place] [address] office: [phone] fax: [phone]     \    Physical Therapy: TREATMENT/PROGRESS NOTE   Patient: Brittany Echevarria (53 y.o. male)   Examination Date: 2024   :  1970 MRN: [id number]   Visit #: 18   Insurance Allowable Auth Needed   [facility]   20 visits  to     12 visits  to   [id number]   []Yes    []No    Insurance: Payor: [employer]CP / Plan: [employer]CP / Product Type: *No Product type* /   Insurance ID: [id number] - (Worker's Comp)  Secondary Insurance (if applicable):    Treatment Diagnosis:   No diagnosis found.    1. Pain  R52         2. Decreased activity tolerance  R68.89         3. Gait difficulty  R26.9         4. Swelling  R60.9         5. Difficulty sleeping  G47.9         6. Difficulty performing work activities  Z56.89         7. Decreased exercise tolerance  R68.89         8. Limited joint range of motion (ROM)  M25.60         9. Functional weakness  R53.1         10. Decreased ability to perform activities  R68.89         11. Need for home exercise program  Z78.9        Medical Diagnosis:  Complex tear of medial meniscus of right knee, unspecified whether old or current tear, initial encounter [S83.231A]  Sprain of right knee, unspecified ligament, initial encounter [S83.91XA]   Referring Physician: Lopez Sandoval MD  PCP: Tara Galicia PA     Plan of care signed (Y/N):     Date of Patient follow up with Physician:      Progress Report/POC: NO  POC update due: (10 visits /OR AUTH LIMITS, whichever is less)  5/10/2024                                             Precautions/ Contra-indications:           Latex allergy:  NO  Pacemaker:    NO  Contraindications for Manipulation: None  Date of Surgery: 3/28/24  Other:    Red Flags:  High SENSITIVITY TO PALPATION    C-SSRS Triggered by Intake questionnaire:

## 2024-07-05 ENCOUNTER — APPOINTMENT (OUTPATIENT)
Dept: PHYSICAL THERAPY | Age: 54
End: 2024-07-05
Attending: ORTHOPAEDIC SURGERY
Payer: OTHER GOVERNMENT

## 2024-07-09 ENCOUNTER — HOSPITAL ENCOUNTER (OUTPATIENT)
Dept: PHYSICAL THERAPY | Age: 54
Setting detail: THERAPIES SERIES
Discharge: HOME | End: 2024-07-09
Attending: ORTHOPAEDIC SURGERY
Payer: OTHER GOVERNMENT

## 2024-07-09 PROCEDURE — 97110 THERAPEUTIC EXERCISES: CPT

## 2024-07-09 NOTE — FLOWSHEET NOTE
related to strengthening, flexibility, endurance, ROM performed to prevent loss of range of motion, maintain or improve muscular strength or increase flexibility, following either an injury or surgery.      GOALS     Patient stated goal: able to return to work/ hobbies w/o limitation.  Status: [x] Progressing: [] Met: [] Not Met: [] Adjusted    Therapist goals for Patient:   Short Term Goals: To be achieved in: 2 weeks  Independent in HEP and progression per patient tolerance, in order to progress toward full function and prevent re-injury.    Status: [x] Progressing: [] Met: [] Not Met: [] Adjusted  Patient will have a decrease in pain to 3/10 to help facilitate improvement in movement, function, and ADLs as indicated by functional deficits.   Status: [x] Progressing: [] Met: [] Not Met: [] Adjusted    Long Term Goals: To be achieved in: 8-10 weeks  Disability index score of 15% or less for the WOMAC to assist with return top prior level of function.   Status: [x] Progressing: [] Met: [] Not Met: [] Adjusted  Improve ROM to WNL to allow for proper joint functioning as indicated by patients functional deficits.  Status: [x] Progressing: [] Met: [] Not Met: [] Adjusted  Pt to improve strength to 4+/5 or better of proximal hip, quadriceps, gastroc/soleus, and hamstrings to allow for proper muscle and joint use in functional mobility, ADLs and prior level of function   Status: [x] Progressing: [] Met: [] Not Met: [] Adjusted  Patient will return to  Usual work, housework or activities, Usual recreational activities, heavy home activities, walk 2 blocks, and up/down 1 flight of stairs  without increased symptoms or restriction to work towards return to prior level of function.        Status: [x] Progressing: [] Met: [] Not Met: [] Adjusted  Patient will increase LE function to return to work with regular duties.   Patient will resume normal work/leisure activities without pain.            Status: [x] Progressing: []

## 2024-07-11 ENCOUNTER — APPOINTMENT (OUTPATIENT)
Dept: PHYSICAL THERAPY | Age: 54
End: 2024-07-11
Attending: ORTHOPAEDIC SURGERY
Payer: OTHER GOVERNMENT

## 2024-07-16 ENCOUNTER — HOSPITAL ENCOUNTER (OUTPATIENT)
Dept: PHYSICAL THERAPY | Age: 54
Setting detail: THERAPIES SERIES
Discharge: HOME | End: 2024-07-16
Attending: ORTHOPAEDIC SURGERY
Payer: OTHER GOVERNMENT

## 2024-07-16 PROCEDURE — 97110 THERAPEUTIC EXERCISES: CPT

## 2024-07-16 NOTE — FLOWSHEET NOTE
[facility]- Outpatient Rehabilitation and Therapy [address], [address], [place] [address] office: [phone] fax: [phone]     \    Physical Therapy: TREATMENT/PROGRESS NOTE   Patient: Brittany Echevarria (53 y.o. male)   Examination Date: 2024   :  1970 MRN: [id number]   Visit #: 20   Insurance Allowable Auth Needed   [facility]   20 visits  to     12 visits  to   [phone]   []Yes    []No    Insurance: Payor: [employer]CP / Plan: [employer] OWCP / Product Type: *No Product type* /   Insurance ID: [id number] - (Worker's Comp)  Secondary Insurance (if applicable):    Treatment Diagnosis:   No diagnosis found.    1. Pain  R52         2. Decreased activity tolerance  R68.89         3. Gait difficulty  R26.9         4. Swelling  R60.9         5. Difficulty sleeping  G47.9         6. Difficulty performing work activities  Z56.89         7. Decreased exercise tolerance  R68.89         8. Limited joint range of motion (ROM)  M25.60         9. Functional weakness  R53.1         10. Decreased ability to perform activities  R68.89         11. Need for home exercise program  Z78.9        Medical Diagnosis:  Complex tear of medial meniscus of right knee, unspecified whether old or current tear, initial encounter [S83.231A]  Sprain of right knee, unspecified ligament, initial encounter [S83.91XA]   Referring Physician: Lopez Sandoval MD  PCP: Tara Galicia PA     Plan of care signed (Y/N):     Date of Patient follow up with Physician:      Progress Report/POC: NO  POC update due: (10 visits /OR AUTH LIMITS, whichever is less)  5/10/2024                                             Precautions/ Contra-indications:           Latex allergy:  NO  Pacemaker:    NO  Contraindications for Manipulation: None  Date of Surgery: 3/28/24  Other:    Red Flags:  High SENSITIVITY TO PALPATION    C-SSRS Triggered by Intake questionnaire:

## 2024-07-19 ENCOUNTER — HOSPITAL ENCOUNTER (OUTPATIENT)
Dept: PHYSICAL THERAPY | Age: 54
Setting detail: THERAPIES SERIES
End: 2024-07-19
Attending: ORTHOPAEDIC SURGERY
Payer: OTHER GOVERNMENT

## 2024-07-26 ENCOUNTER — APPOINTMENT (OUTPATIENT)
Dept: PHYSICAL THERAPY | Age: 54
End: 2024-07-26
Attending: ORTHOPAEDIC SURGERY
Payer: OTHER GOVERNMENT

## 2024-07-29 ENCOUNTER — OFFICE VISIT (OUTPATIENT)
Dept: ORTHOPEDIC SURGERY | Age: 54
End: 2024-07-29

## 2024-07-29 VITALS — WEIGHT: 260 LBS | BODY MASS INDEX: 41.78 KG/M2 | HEIGHT: 66 IN

## 2024-07-29 DIAGNOSIS — S83.231A COMPLEX TEAR OF MEDIAL MENISCUS OF RIGHT KNEE, UNSPECIFIED WHETHER OLD OR CURRENT TEAR, INITIAL ENCOUNTER: Primary | ICD-10-CM

## 2024-07-29 NOTE — PROGRESS NOTES
Knee Arthroscopy Follow-up  Brittany Echevarria is here for follow up after right knee arthroscopic surgery. Surgery date was 3/28/24. Findings at surgery: medial meniscus tear, lateral meniscus tear.   The patient's pain is rated at 7/10, but he just came from physical therapy.  He has been performing physical therapy at the [facility].  He complains of knee weakness.  He states that they tried to have him drive and even after just 2 hours he felt cramping around his calf.  He would like to have restrictions that include no driving.        Physical Examination:  Ht 1.676 m (5' 6\")   Wt 117.9 kg (260 lb)   BMI 41.97 kg/m²    Patient is awake, alert, and in no acute distress.  He is using a cane.  Right knee ROM 0-120.  No effusion.  Mild quad atrophy and inhibition.  He has mild diffuse global tenderness around his knee which include his medial joint line, lateral joint line, tibial plateau, distal femur.        Assessment:   4 months status post right knee arthroscopy, partial medial and lateral meniscectomies      Plan:   Continue physical therapy.  I would like him to perform baseline Biodex test to evaluate his strength. Discussed that he likely will have some discomfort as he progresses his rehab and strengthening.  He has essentially been limited for the last 2 years while awaiting [facility] approval for surgery.  He is likely significantly deconditioned.  Discussed that I do not know how long it will take him to be able to get back to being a .    OTC NSAIDs as needed.    He is a .   We will limit him to sedentary/clerical restrictions.  No pivoting, twisting, turning.  No driving. 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      Lopez Sandoval MD  Orthopaedic Surgery and Sports Medicine       This note was generated with use of a verbal recognition program and was checked for errors.  It is possible that there are still dictated errors

## 2024-07-30 ENCOUNTER — APPOINTMENT (OUTPATIENT)
Dept: PHYSICAL THERAPY | Age: 54
End: 2024-07-30
Attending: ORTHOPAEDIC SURGERY
Payer: OTHER GOVERNMENT

## 2024-08-02 ENCOUNTER — APPOINTMENT (OUTPATIENT)
Dept: PHYSICAL THERAPY | Age: 54
End: 2024-08-02
Attending: ORTHOPAEDIC SURGERY
Payer: OTHER GOVERNMENT

## 2024-08-06 ENCOUNTER — TELEPHONE (OUTPATIENT)
Dept: ORTHOPEDIC SURGERY | Age: 54
End: 2024-08-06

## 2024-08-06 ENCOUNTER — HOSPITAL ENCOUNTER (OUTPATIENT)
Dept: PHYSICAL THERAPY | Age: 54
Setting detail: THERAPIES SERIES
Discharge: HOME | End: 2024-08-06
Attending: ORTHOPAEDIC SURGERY
Payer: OTHER GOVERNMENT

## 2024-08-06 ENCOUNTER — APPOINTMENT (OUTPATIENT)
Dept: PHYSICAL THERAPY | Age: 54
End: 2024-08-06
Attending: ORTHOPAEDIC SURGERY
Payer: OTHER GOVERNMENT

## 2024-08-06 PROCEDURE — 97110 THERAPEUTIC EXERCISES: CPT

## 2024-08-06 PROCEDURE — 97140 MANUAL THERAPY 1/> REGIONS: CPT

## 2024-08-06 PROCEDURE — 97530 THERAPEUTIC ACTIVITIES: CPT

## 2024-08-06 NOTE — TELEPHONE ENCOUNTER
CA-17 completed at visit and scanned into Media. Routed patient copy through [employer] for patient to notify.

## 2024-08-06 NOTE — TELEPHONE ENCOUNTER
S/W Patient he is asking for a copy of his CA-17 be added into his mychart. He stated he does not see in there.    I did make him aware I uploaded his CA-17 to [facility] if he has access, he can see it there as well.      Ph: [phone]

## 2024-08-06 NOTE — FLOWSHEET NOTE
[facility]- Outpatient Rehabilitation and Therapy [address], [address], [place] [address] office: [phone] fax: [phone]     \    Physical Therapy: TREATMENT/PROGRESS NOTE   Patient: Brittany Echevarria (54 y.o. male)   Examination Date: 2024   :  1970 MRN: [id number]   Visit #: 21   Insurance Allowable Auth Needed   [facility]   20 visits  to     12 visits  to   [phone]   []Yes    []No    Insurance: Payor: [employer]CP / Plan: [employer] OWCP / Product Type: *No Product type* /   Insurance ID: [id number] - (Worker's Comp)  Secondary Insurance (if applicable):    Treatment Diagnosis:   No diagnosis found.    1. Pain  R52         2. Decreased activity tolerance  R68.89         3. Gait difficulty  R26.9         4. Swelling  R60.9         5. Difficulty sleeping  G47.9         6. Difficulty performing work activities  Z56.89         7. Decreased exercise tolerance  R68.89         8. Limited joint range of motion (ROM)  M25.60         9. Functional weakness  R53.1         10. Decreased ability to perform activities  R68.89         11. Need for home exercise program  Z78.9        Medical Diagnosis:  Complex tear of medial meniscus of right knee, unspecified whether old or current tear, initial encounter [S83.231A]  Sprain of right knee, unspecified ligament, initial encounter [S83.91XA]   Referring Physician: Lopez Sandoval MD  PCP: Tara Galicia PA     Plan of care signed (Y/N):     Date of Patient follow up with Physician:      Progress Report/POC: NO  POC update due: (10 visits /OR AUTH LIMITS, whichever is less)  5/10/2024                                             Precautions/ Contra-indications:           Latex allergy:  NO  Pacemaker:    NO  Contraindications for Manipulation: None  Date of Surgery: 3/28/24  Other:    Red Flags:  High SENSITIVITY TO PALPATION    C-SSRS Triggered by Intake questionnaire:

## 2024-08-07 NOTE — TELEPHONE ENCOUNTER
CA17 NEEDS UPDATING,     DOL WILL NOT ACCEPT ITEMS LISTED IN BOX (t) ONLY.    It is okay to leave restrictions listed in the other(t) box, but the actual items HAVE to be checked.    PLEASE CHECK BOXES:   A. Lifting/Carrying  Continuous  10lbs   2-4 hrs per day   B. Sitting      Continuous  10lbs             2-4 hrs per day   C. Standing  Continuous  10lbs             2-4 hrs per day      D. Walking   Continuous                         2 hrs per day      Please correct and re-scan into patient chart.

## 2024-08-09 ENCOUNTER — APPOINTMENT (OUTPATIENT)
Dept: PHYSICAL THERAPY | Age: 54
End: 2024-08-09
Attending: ORTHOPAEDIC SURGERY
Payer: OTHER GOVERNMENT

## 2024-08-09 ENCOUNTER — HOSPITAL ENCOUNTER (OUTPATIENT)
Dept: PHYSICAL THERAPY | Age: 54
Setting detail: THERAPIES SERIES
Discharge: HOME | End: 2024-08-09
Attending: ORTHOPAEDIC SURGERY
Payer: OTHER GOVERNMENT

## 2024-08-09 PROCEDURE — 97110 THERAPEUTIC EXERCISES: CPT

## 2024-08-09 PROCEDURE — 97140 MANUAL THERAPY 1/> REGIONS: CPT

## 2024-08-09 NOTE — FLOWSHEET NOTE
functioning as indicated by patients functional deficits.  Status: [x] Progressing: [] Met: [] Not Met: [] Adjusted  Pt to improve strength to 4+/5 or better of proximal hip, quadriceps, gastroc/soleus, and hamstrings to allow for proper muscle and joint use in functional mobility, ADLs and prior level of function   Status: [x] Progressing: [] Met: [] Not Met: [] Adjusted  Patient will return to  Usual work, housework or activities, Usual recreational activities, heavy home activities, walk 2 blocks, and up/down 1 flight of stairs  without increased symptoms or restriction to work towards return to prior level of function.        Status: [x] Progressing: [] Met: [] Not Met: [] Adjusted  Patient will increase LE function to return to work with regular duties.   Patient will resume normal work/leisure activities without pain.            Status: [x] Progressing: [] Met: [x] Not Met: [] Adjusted    Overall Progression Towards Functional goals/ Treatment Progress Update:  [] Patient is progressing as expected towards functional goals listed.    [x] Progression is slowed due to complexities/Impairments listed.  [] Progression has been slowed due to co-morbidities.  [] Plan just implemented, too soon (<30days) to assess goals progression   [] Goals require adjustment due to lack of progress  [] Patient is not progressing as expected and requires additional follow up with physician  [] Other:     TREATMENT PLAN     Frequency/Duration: 2-3x/week for 8-10 weeks for the following treatment interventions:    Interventions:  Therapeutic Exercise (21162) including: strength training, ROM, and functional mobility  Therapeutic Activities (40182) including: functional mobility training and education.  Neuromuscular Re-education (76678) activation and proprioception, including postural re-education.    Gait Training (87377) for normalization of ambulation patterns and AD training.   Manual Therapy (43002) as indicated to include:

## 2024-08-13 ENCOUNTER — APPOINTMENT (OUTPATIENT)
Dept: PHYSICAL THERAPY | Age: 54
End: 2024-08-13
Attending: ORTHOPAEDIC SURGERY
Payer: OTHER GOVERNMENT

## 2024-08-15 ENCOUNTER — HOSPITAL ENCOUNTER (OUTPATIENT)
Dept: PHYSICAL THERAPY | Age: 54
Setting detail: THERAPIES SERIES
Discharge: HOME | End: 2024-08-15
Attending: ORTHOPAEDIC SURGERY
Payer: OTHER GOVERNMENT

## 2024-08-15 PROCEDURE — 97110 THERAPEUTIC EXERCISES: CPT

## 2024-08-15 PROCEDURE — 97140 MANUAL THERAPY 1/> REGIONS: CPT

## 2024-08-15 NOTE — FLOWSHEET NOTE
[facility]- Outpatient Rehabilitation and Therapy [address], [address], [place] [address] office: [phone] fax: [phone]     \    Physical Therapy: TREATMENT/PROGRESS NOTE   Patient: Brittany Echevarria (54 y.o. male)   Examination Date: 08/15/2024   :  1970 MRN: [id number]   Visit #: 23   Insurance Allowable Auth Needed   [facility]   20 visits  to     12 visits  to   [phone]   []Yes    []No    Insurance: Payor: [employer]PARTMENT [employer] OWCP / Plan: [employer]MENT [employer] OWCP / Product Type: *No Product type* /   Insurance ID: [id number] - (Worker's Comp)  Secondary Insurance (if applicable):    Treatment Diagnosis:   No diagnosis found.    1. Pain  R52         2. Decreased activity tolerance  R68.89         3. Gait difficulty  R26.9         4. Swelling  R60.9         5. Difficulty sleeping  G47.9         6. Difficulty performing work activities  Z56.89         7. Decreased exercise tolerance  R68.89         8. Limited joint range of motion (ROM)  M25.60         9. Functional weakness  R53.1         10. Decreased ability to perform activities  R68.89         11. Need for home exercise program  Z78.9        Medical Diagnosis:  Complex tear of medial meniscus of right knee, unspecified whether old or current tear, initial encounter [S83.231A]  Sprain of right knee, unspecified ligament, initial encounter [S83.91XA]   Referring Physician: Lopez Sandoval MD  PCP: Tara Galicia PA     Plan of care signed (Y/N):     Date of Patient follow up with Physician:      Progress Report/POC: NO  POC update due: (10 visits /OR AUTH LIMITS, whichever is less)  5/10/2024                                             Precautions/ Contra-indications:           Latex allergy:  NO  Pacemaker:    NO  Contraindications for Manipulation: None  Date of Surgery: 3/28/24  Other:    Red Flags:  High SENSITIVITY TO PALPATION    C-SSRS Triggered by Intake questionnaire:

## 2024-08-19 ENCOUNTER — HOSPITAL ENCOUNTER (OUTPATIENT)
Dept: PHYSICAL THERAPY | Age: 54
Setting detail: THERAPIES SERIES
Discharge: HOME | End: 2024-08-19
Attending: ORTHOPAEDIC SURGERY
Payer: OTHER GOVERNMENT

## 2024-08-19 NOTE — FLOWSHEET NOTE
[facility] Outpatient Rehabilitation and Therapy [address] [place] [address] office: [phone] fax: [phone]      Isokinetic Strength Testing Results Summary  Knee        Patient Name:  Brittany Echevarria    :  1970  MRN: [id number]  Restrictions/Precautions:   Medical/Treatment Diagnosis Information:    Complex tear of medial meniscus of right knee, unspecified whether old or current tear, initial encounter [S83.231A]  Sprain of right knee, unspecified ligament, initial encounter [S83.91XA]     Insurance/Certification information:     Physician Information:   Lopez Sandoval MD   Pain level: /10    Latex Allergy:  [x]NO      []YES  Preferred Language for Healthcare:   [x]English       []other:    Visit # Insurance Allowable Auth required? Date Range   [facility]  []  Yes  []  No      Next Progress Note due by:    Next Plan of Care due by:         On 2024 the patient, Brittany Echevarria, underwent an Isokinetic Strength Test to evaluate current status and progress of the strengthening phase of his/her current rehabilitation program.  He/She is currently being treated for R PMMe performed on 3/28/24.     Attached you will find a computer generated summary of the results which outlines the current status.      To summarize these results you will find that Brittany Echevarria underwent a test measuring the strength of the knee Quadriceps and Hamstrings muscle groups at isokinetic speeds of 180 and 300 degrees/second.  Standard protocol of testing is to provide pre-test stretching and warm up of both extremities followed by instruction in the test procedure and \"practice\" repetitions prior to each actual test session.  The uninjured extremity undergoes the test procedure first followed by the injured extremity.  The two speeds of resistance represent the power and endurance functions of the muscle groups tested.      Subjective: Pt reports for initial isokinetic testing this

## 2024-08-20 ENCOUNTER — HOSPITAL ENCOUNTER (OUTPATIENT)
Dept: PHYSICAL THERAPY | Age: 54
Setting detail: THERAPIES SERIES
Discharge: HOME | End: 2024-08-20
Attending: ORTHOPAEDIC SURGERY
Payer: OTHER GOVERNMENT

## 2024-08-20 PROCEDURE — 97110 THERAPEUTIC EXERCISES: CPT

## 2024-08-20 PROCEDURE — 97750 PHYSICAL PERFORMANCE TEST: CPT

## 2024-08-20 NOTE — FLOWSHEET NOTE
Adjusted  Patient will increase LE function to return to work with regular duties.   Patient will resume normal work/leisure activities without pain.            Status: [x] Progressing: [] Met: [x] Not Met: [] Adjusted    Overall Progression Towards Functional goals/ Treatment Progress Update:  [] Patient is progressing as expected towards functional goals listed.    [x] Progression is slowed due to complexities/Impairments listed.  [] Progression has been slowed due to co-morbidities.  [] Plan just implemented, too soon (<30days) to assess goals progression   [] Goals require adjustment due to lack of progress  [] Patient is not progressing as expected and requires additional follow up with physician  [] Other:     TREATMENT PLAN     Frequency/Duration: 2-3x/week for 8-10 weeks for the following treatment interventions:    Interventions:  Therapeutic Exercise (41565) including: strength training, ROM, and functional mobility  Therapeutic Activities (92793) including: functional mobility training and education.  Neuromuscular Re-education (31729) activation and proprioception, including postural re-education.    Gait Training (17524) for normalization of ambulation patterns and AD training.   Manual Therapy (67876) as indicated to include: Passive Range of Motion, Gr I-IV mobilizations, Soft Tissue Mobilization, and Dry Needling/IASTM  Modalities as needed that may include: Cryotherapy, Electrical Stimulation, Thermal Agents, and Vasoneumatic Compression  Patient education on joint protection, postural re-education, activity modification, and progression of HEP    Plan: Cont POC- Continue emphasis/focus on exercise progression, improving proper muscle recruitment and activation/motor control patterns, modulating pain, promoting relaxation, increasing ROM, reduce/eliminate soft tissue swelling/inflammation/restriction, improving soft tissue extensibility, allowing for proper ROM, static and dynamic balance, and

## 2024-08-23 ENCOUNTER — HOSPITAL ENCOUNTER (OUTPATIENT)
Dept: PHYSICAL THERAPY | Age: 54
Setting detail: THERAPIES SERIES
Discharge: HOME | End: 2024-08-23
Attending: ORTHOPAEDIC SURGERY
Payer: OTHER GOVERNMENT

## 2024-08-23 PROCEDURE — 97110 THERAPEUTIC EXERCISES: CPT

## 2024-08-23 NOTE — FLOWSHEET NOTE
progress balance   8/6: plan to resume OP PT at least a few more weeks; get biodex in near future and go from there.     Electronically Signed by Johnathon Jorge, PT  Date: 08/23/2024     Note: Portions of this note have been templated and/or copied from initial evaluation, reassessments and prior notes for documentation efficiency.    Note: If patient does not return for scheduled/recommended follow up visits, this note will serve as a discharge from care along with the most recent update on progress.

## 2024-08-27 ENCOUNTER — HOSPITAL ENCOUNTER (OUTPATIENT)
Dept: PHYSICAL THERAPY | Age: 54
Setting detail: THERAPIES SERIES
Discharge: HOME | End: 2024-08-27
Attending: ORTHOPAEDIC SURGERY
Payer: OTHER GOVERNMENT

## 2024-08-27 PROCEDURE — 97110 THERAPEUTIC EXERCISES: CPT

## 2024-08-27 PROCEDURE — 97140 MANUAL THERAPY 1/> REGIONS: CPT

## 2024-08-27 NOTE — FLOWSHEET NOTE
[facility]- Outpatient Rehabilitation and Therapy [address], [address], [place] [address] office: [phone] fax: [phone]         Physical Therapy Re-Certification Plan of Care/MD UPDATE                  Physical Therapy: TREATMENT/PROGRESS NOTE   Patient: Brittany Echevarria (54 y.o. male)   Examination Date: 2024   :  1970 MRN: [id number]   Visit #: 27   Insurance Allowable Auth Needed   [facility]   20 visits  to     12 visits  to   [id number]   []Yes    []No    Insurance: Payor: [employer] / Plan: [employer] / Product Type: *No Product type* /   Insurance ID: [id number] - (Worker's Comp)  Secondary Insurance (if applicable):    Treatment Diagnosis:   No diagnosis found.    1. Pain  R52         2. Decreased activity tolerance  R68.89         3. Gait difficulty  R26.9         4. Swelling  R60.9         5. Difficulty sleeping  G47.9         6. Difficulty performing work activities  Z56.89         7. Decreased exercise tolerance  R68.89         8. Limited joint range of motion (ROM)  M25.60         9. Functional weakness  R53.1         10. Decreased ability to perform activities  R68.89         11. Need for home exercise program  Z78.9        Medical Diagnosis:  Complex tear of medial meniscus of right knee, unspecified whether old or current tear, initial encounter [S83.231A]  Sprain of right knee, unspecified ligament, initial encounter [S83.91XA]   Referring Physician: Lopez Sandoval MD  PCP: Tara Galicia PA     Plan of care signed (Y/N):     Date of Patient follow up with Physician:      Progress Report/POC: NO  POC update due: (10 visits /OR AUTH LIMITS, whichever is less)  5/10/2024                                             Precautions/ Contra-indications:           Latex allergy:  NO  Pacemaker:    NO  Contraindications for Manipulation: None  Date of Surgery: 3/28/24  Other:    Red Flags:  High  SENSITIVITY TO PALPATION    C-SSRS Triggered by Intake questionnaire:   [x] No, Questionnaire did not trigger screening.   [] Yes, Patient intake triggered further evaluation      [] C-SSRS Screening completed  [] PCP notified via Plan of Care  [] Emergency services notified     Preferred Language for Healthcare:   [x] English       [] other:  Patient stated complaint: Pt here for evaluation R knee s/p knee scope on 3/28/24. He injured his knee apparently a year or more ago at work when he fell onto R knee. He was on restrictions to sedentary work only and kept having issues getting surgery approved. He has kept his knee mostly straight and avoided most activity since initial injury. He did not have any PT prior to surgery. He enters today using a cane on same side, essentially stiff leg walking, and states he is very afraid to move his knee. Incisions are covered and no excessive redness or drainage noted.     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 He has not done his route in many months; limited to sedentary work.    Used to walk 12-13 miles a day.     LUMBAR MRI: (pre knee surgery)  FINDINGS:  BONES/ALIGNMENT: There is normal alignment of the lumbar spine.  The bone  marrow signal intensity is normal in appearance.  There is no evidence of an  acute fracture.  Benign hemangiomas are noted at L2 and L3.  There is a  chronic L1 superior endplate fracture with approximately 10% loss of  vertebral body height.     SPINAL CORD: The conus tip terminates near the level of the L2 vertebral  body.  The cauda equina nerve roots are unremarkable.     SOFT TISSUES: No paraspinal mass identified.     L1-L2: There is no significant disc herniation, spinal canal stenosis or  neural foraminal narrowing.     L2-L3: There is no significant disc herniation, spinal canal stenosis or  neural foraminal narrowing.     L3-L4: There is a minimal disc bulge indenting the anterior thecal sac.  No  central spinal canal or foraminal

## 2024-08-30 ENCOUNTER — APPOINTMENT (OUTPATIENT)
Dept: PHYSICAL THERAPY | Age: 54
End: 2024-08-30
Attending: ORTHOPAEDIC SURGERY
Payer: OTHER GOVERNMENT

## 2024-09-23 ENCOUNTER — TELEPHONE (OUTPATIENT)
Dept: ORTHOPEDIC SURGERY | Age: 54
End: 2024-09-23

## 2024-09-23 NOTE — TELEPHONE ENCOUNTER
Received a call from Mary Kate Low at [facility]  she is wanting to know if IW should be doing work conditioning or hardening, waiting until his next appointment. His last PT was on 8-    Ph: [phone]

## 2024-09-24 NOTE — TELEPHONE ENCOUNTER
Review of PT note states he seems to have more low back and posterior leg pain. He does have lumbar MRI from last year.  May need to see spine for non-BWC related radiculopathy    Biodex test last month demonstrated 60-80% weakness of right side compared to left. He had been on limitations from work for over 2 years. He is not ready for work hardening / conditioning to work as .

## 2024-09-25 ENCOUNTER — TELEPHONE (OUTPATIENT)
Dept: ORTHOPEDIC SURGERY | Age: 54
End: 2024-09-25

## 2024-10-07 ENCOUNTER — OFFICE VISIT (OUTPATIENT)
Dept: ORTHOPEDIC SURGERY | Age: 54
End: 2024-10-07

## 2024-10-07 ENCOUNTER — TELEPHONE (OUTPATIENT)
Dept: ORTHOPEDIC SURGERY | Age: 54
End: 2024-10-07

## 2024-10-07 VITALS — WEIGHT: 265 LBS | RESPIRATION RATE: 16 BRPM | BODY MASS INDEX: 42.59 KG/M2 | HEIGHT: 66 IN

## 2024-10-07 DIAGNOSIS — S83.231A COMPLEX TEAR OF MEDIAL MENISCUS OF RIGHT KNEE, UNSPECIFIED WHETHER OLD OR CURRENT TEAR, INITIAL ENCOUNTER: Primary | ICD-10-CM

## 2024-10-07 NOTE — PROGRESS NOTES
History:  Brittany Echevarria is here for follow up after right knee arthroscopic surgery. Surgery date was 3/28/24. Findings at surgery: medial meniscus tear, lateral meniscus tear.   The patient's pain is rated at 8/10.  He last went to physical therapy on 8/27/2024.  He states that further physical therapy was denied by [facility].  He does note low back pain.  He notes radicular pain.  He does state that his back pain started at the same time as his knee pain.      Physical Examination:  Resp 16   Ht 1.676 m (5' 6\")   Wt 120.2 kg (265 lb)   BMI 42.77 kg/m²    Patient is awake, alert, and in no acute distress.  He is using a cane.  Right knee ROM 0-120.  No effusion.  Mild quad atrophy and inhibition.  He has mild diffuse global tenderness around his knee which include his medial joint line, lateral joint line, tibial plateau, distal femur.  Positive straight leg raise      8/19 BIODEX TEST:   Quadricep 60 deg Isometric  61.6% [x] Deficit   [] Surplus   Hamstring 60 deg isometric 83.8% [x] Deficit   [] Surplus   Normative Data, 60 degrees/second:  Quadricep Normal: 70% Patient: 5.2%   Hamstring Normal: 42% Patient: 1.6%   Biodex, however was limited secondary to inability to maintain proper speed of test.        Assessment:   6-1/2 months status post right knee arthroscopy, partial medial and lateral meniscectomies  Lumbar radiculopathy      Plan:   I recommend he continue physical therapy.  He has noted to have significant weakness in the right leg.  As previously noted in the telephone note, I do not believe that he is ready for work hardening to return to work as a  given the significant weakness.  There may be some component of lumbar radiculopathy that is also affecting his strength.  I do recommend evaluation by the spine team for possible injections.    OTC NSAIDs as needed.    He is a .   We will limit him to sedentary/clerical restrictions.  No pivoting, twisting, turning.  No

## 2024-11-05 ENCOUNTER — OFFICE VISIT (OUTPATIENT)
Dept: ORTHOPEDIC SURGERY | Age: 54
End: 2024-11-05

## 2024-11-05 VITALS — BODY MASS INDEX: 42.59 KG/M2 | WEIGHT: 264.99 LBS | HEIGHT: 66 IN

## 2024-11-05 DIAGNOSIS — S39.012A LUMBAR STRAIN, INITIAL ENCOUNTER: ICD-10-CM

## 2024-11-05 DIAGNOSIS — M47.816 LUMBAR SPONDYLOSIS: Primary | ICD-10-CM

## 2024-11-05 SDOH — HEALTH STABILITY: PHYSICAL HEALTH: ON AVERAGE, HOW MANY DAYS PER WEEK DO YOU ENGAGE IN MODERATE TO STRENUOUS EXERCISE (LIKE A BRISK WALK)?: 3 DAYS

## 2024-11-05 NOTE — PROGRESS NOTES
New Patient: LUMBAR SPINE    Referring Provider:  No ref. provider found    CHIEF COMPLAINT:    Chief Complaint   Patient presents with    Back Pain     NP Lumbar - Worker's Comp  Referred by Dr. Sandoval       HISTORY OF PRESENT ILLNESS:    Mr. Brtitany Echevarria  is a pleasant 54 y.o. male presents with 2-1/2-year history of low back pain after a work-related injury.  He rates his low back bilateral leg neck and bilateral trap pain 8/10.  He denies numbness and tingling of his upper and lower extremities.  He denies saddle anesthesia and bowel or bladder dysfunction.      Current/Past Treatment:   Physical Therapy: No  Chiropractic: No  Injection: No  Medications: Naprosyn    Past Medical History:   Noncontributory  Past Surgical History:     Past Surgical History:   Procedure Laterality Date    KNEE ARTHROPLASTY Left     KNEE ARTHROSCOPY Right 03/28/2024    RIGHT KNEE ARTHROSCOPY, PARTIAL MEDIAL MENISCECTOMY performed by Lopez Sandoval MD at [facility] OR    KNEE SURGERY       Current Medications:     Current Outpatient Medications:     naproxen (NAPROSYN) 500 MG tablet, Take 1 tablet by mouth 2 times daily as needed for Pain, Disp: 60 tablet, Rfl: 2    sildenafil (VIAGRA) 100 MG tablet, Take 1 tablet by mouth daily as needed for Erectile Dysfunction, Disp: 30 tablet, Rfl: 3  Allergies:  Shrimp flavor and Shellfish-derived products  Social History:    reports that he has never smoked. He has never used smokeless tobacco. He reports current alcohol use. He reports current drug use. Drug: Marijuana (Weed).  Family History:   Family History   Problem Relation Age of Onset    No Known Problems Father        REVIEW OF SYSTEMS: Full ROS noted & scanned   CONSTITUTIONAL: Denies unexplained weight loss, fevers, chills or fatigue  NEUROLOGICAL: Denies unsteady gait or progressive weakness  MUSCULOSKELETAL: Denies joint swelling or redness  PSYCHOLOGICAL: Denies anxiety, depression   SKIN: Denies skin changes, delayed

## 2024-11-12 ENCOUNTER — TELEPHONE (OUTPATIENT)
Dept: PHYSICAL THERAPY | Age: 54
End: 2024-11-12

## 2024-11-12 NOTE — TELEPHONE ENCOUNTER
Called patient to notify we have not received an approved C-9 . Left message for patient to call [facility]  or reach out to Doctor office to check on status.

## 2025-01-13 ENCOUNTER — OFFICE VISIT (OUTPATIENT)
Dept: ORTHOPEDIC SURGERY | Age: 55
End: 2025-01-13

## 2025-01-13 VITALS — WEIGHT: 265 LBS | BODY MASS INDEX: 42.59 KG/M2 | HEIGHT: 66 IN

## 2025-01-13 DIAGNOSIS — S83.231A COMPLEX TEAR OF MEDIAL MENISCUS OF RIGHT KNEE, UNSPECIFIED WHETHER OLD OR CURRENT TEAR, INITIAL ENCOUNTER: Primary | ICD-10-CM

## 2025-01-13 NOTE — PROGRESS NOTES
History:  Brittany Echevarria is here for follow up after right knee arthroscopic surgery. Surgery date was 3/28/24. Findings at surgery: medial meniscus tear, lateral meniscus tear.   The patient's pain is rated at 7/10.  He last went to physical therapy on 8/27/2024.  He states that further physical therapy was denied by [facility].  PT for his spine was also recommended by Dr. Melvin.  He notes difficulty walking long distances.      Physical Examination:  Ht 1.676 m (5' 6\")   Wt 120.2 kg (265 lb)   BMI 42.77 kg/m²    Patient is awake, alert, and in no acute distress.  He is using a cane.  Right knee ROM 0-120.  No effusion.  Mild quad atrophy and inhibition.  He has mild diffuse global tenderness around his knee which include his medial joint line, lateral joint line, tibial plateau, distal femur.        8/19 BIODEX TEST:   Quadricep 60 deg Isometric  61.6% [x] Deficit   [] Surplus   Hamstring 60 deg isometric 83.8% [x] Deficit   [] Surplus   Normative Data, 60 degrees/second:  Quadricep Normal: 70% Patient: 5.2%   Hamstring Normal: 42% Patient: 1.6%   Biodex, however was limited secondary to inability to maintain proper speed of test.        Assessment:   9-1/2 months status post right knee arthroscopy, partial medial and lateral meniscectomies  Lumbar radiculopathy      Plan:   I recommend he continue physical therapy.  He has noted to have significant weakness in the right leg.  As previously noted, I do not believe that he is ready for work hardening to return to work as a  given the significant weakness.  I significantly believe that this is related to his extended time of deconditioning were we were waiting for [facility] approval for surgery.  Having over 60% weakness in the right leg compared to the left leg, this likely is also causing functional problems with the right leg when ambulating, which could be aggravating his back, hip, or ankle.  I did discuss with the patient seeing about trying to

## 2025-04-14 ENCOUNTER — OFFICE VISIT (OUTPATIENT)
Dept: ORTHOPEDIC SURGERY | Age: 55
End: 2025-04-14

## 2025-04-14 ENCOUNTER — TELEPHONE (OUTPATIENT)
Dept: ORTHOPEDIC SURGERY | Age: 55
End: 2025-04-14

## 2025-04-14 VITALS — BODY MASS INDEX: 41.78 KG/M2 | RESPIRATION RATE: 16 BRPM | WEIGHT: 260 LBS | HEIGHT: 66 IN

## 2025-04-14 DIAGNOSIS — S83.231A COMPLEX TEAR OF MEDIAL MENISCUS OF RIGHT KNEE, UNSPECIFIED WHETHER OLD OR CURRENT TEAR, INITIAL ENCOUNTER: Primary | ICD-10-CM

## 2025-04-14 DIAGNOSIS — M47.816 LUMBAR SPONDYLOSIS: ICD-10-CM

## 2025-04-14 NOTE — PROGRESS NOTES
History:  Brittany Echevarria is here for follow up after right knee arthroscopic surgery. Surgery date was 3/28/24. Findings at surgery: medial meniscus tear, lateral meniscus tear.   The patient's pain is rated at 7/10.  He last went to physical therapy on 8/27/2024. Further physical therapy was denied by [facility].  PT for his spine was also recommended by Dr. Melvin, which he has not had.      Physical Examination:  Resp 16   Ht 1.676 m (5' 6\")   Wt 117.9 kg (260 lb)   BMI 41.97 kg/m²    Patient is awake, alert, and in no acute distress.  He is using a cane.  Right knee ROM 0-120.  No effusion.  Mild quad atrophy and inhibition.  He has mild diffuse global tenderness around his knee which include his medial joint line, lateral joint line, tibial plateau, distal femur.        8/19 BIODEX TEST:   Quadricep 60 deg Isometric  61.6% [x] Deficit   [] Surplus   Hamstring 60 deg isometric 83.8% [x] Deficit   [] Surplus   Normative Data, 60 degrees/second:  Quadricep Normal: 70% Patient: 5.2%   Hamstring Normal: 42% Patient: 1.6%   Biodex, however was limited secondary to inability to maintain proper speed of test.        Assessment:   12-1/2 months status post right knee arthroscopy, partial medial and lateral meniscectomies  Lumbar radiculopathy      Plan:   I still recommend he continue physical therapy.  He has noted to have significant weakness in the right leg.  As previously noted, I do not believe that he is ready for work hardening to return to work as a  given the significant weakness.  I significantly believe that this is related to his extended time of deconditioning were we were waiting for [facility] approval for surgery.  Having over 60% weakness in the right leg compared to the left leg, this likely is also causing functional problems with the right leg when ambulating, which could be aggravating his back, hip, or ankle.  He is now over 1 year out from surgery.  Further PT has previously been declined

## 2025-04-14 NOTE — TELEPHONE ENCOUNTER
Following the patient's visit on 4/14/2025, Dr. Sandoval wishes to request the following for the patient:   More Physical Therapy  Functional Capacity Evaluation  [facility].     Please advise clinical staff if further action is needed and/or when approval is received.

## 2025-04-16 NOTE — TELEPHONE ENCOUNTER
MAGALYS Relayed message from WC team- workers comp claim is through [employer]  so the facility that he chooses to go to will have to request authorization for additional PT and and FCE

## 2025-07-14 ENCOUNTER — TELEPHONE (OUTPATIENT)
Dept: ORTHOPEDIC SURGERY | Age: 55
End: 2025-07-14

## 2025-07-14 ENCOUNTER — OFFICE VISIT (OUTPATIENT)
Dept: ORTHOPEDIC SURGERY | Age: 55
End: 2025-07-14

## 2025-07-14 VITALS — RESPIRATION RATE: 16 BRPM | BODY MASS INDEX: 42.43 KG/M2 | WEIGHT: 264 LBS | HEIGHT: 66 IN

## 2025-07-14 DIAGNOSIS — S83.231A COMPLEX TEAR OF MEDIAL MENISCUS OF RIGHT KNEE, UNSPECIFIED WHETHER OLD OR CURRENT TEAR, INITIAL ENCOUNTER: Primary | ICD-10-CM

## 2025-07-14 NOTE — PROGRESS NOTES
History:  Brittany Echevarria is here for follow up after right knee arthroscopic surgery. Surgery date was 3/28/24. Findings at surgery: medial meniscus tear, lateral meniscus tear.   The patient's pain is rated at 8/10.  He last went to physical therapy on 8/27/2024.  He states nothing has been approved by [facility].  He states his  is getting the run around      Physical Examination:  There were no vitals taken for this visit.   Patient is awake, alert, and in no acute distress.  He is using a cane.  Right knee ROM 0-120.  No effusion.  Mild quad atrophy and inhibition.  He has mild diffuse global tenderness around his knee which include his medial joint line, lateral joint line, tibial plateau, distal femur.        8/19 BIODEX TEST:   Quadricep 60 deg Isometric  61.6% [x] Deficit   [] Surplus   Hamstring 60 deg isometric 83.8% [x] Deficit   [] Surplus   Normative Data, 60 degrees/second:  Quadricep Normal: 70% Patient: 5.2%   Hamstring Normal: 42% Patient: 1.6%   Biodex, however was limited secondary to inability to maintain proper speed of test.        Assessment:   15-1/2 months status post right knee arthroscopy, partial medial and lateral meniscectomies  Lumbar radiculopathy  Class 3 obesity      Plan:   I still recommend he continue physical therapy.  He has noted to have significant weakness in the right leg.  As previously noted, I do not believe that he is ready for work hardening to return to work as a  given the significant weakness.  I significantly believe that this is related to his extended time of deconditioning were we were waiting for [facility] approval for surgery.  Having over 60% weakness in the right leg compared to the left leg, this likely is also causing functional problems with the right leg when ambulating, which could be aggravating his back, hip, or ankle.  He is now over 1 year out from surgery.  Further PT has previously been declined by [facility].  Still request further PT.     I

## 2025-07-14 NOTE — TELEPHONE ENCOUNTER
Following the patient's visit on 7/14/2025, Dr. Sandoval wishes to request the following for the patient:   Further Physical Therapy  Functional Capacity Evaluation  Referral for Spine Surgeon    [facility].     Please advise clinical staff if further action is needed and/or when approval is received.

## 2025-07-21 ENCOUNTER — TELEPHONE (OUTPATIENT)
Dept: ORTHOPEDIC SURGERY | Age: 55
End: 2025-07-21

## 2025-07-21 DIAGNOSIS — M47.816 LUMBAR SPONDYLOSIS: Primary | ICD-10-CM

## 2025-07-21 NOTE — TELEPHONE ENCOUNTER
Tried to notify patient that Dr. Sandoval recommends Dr. Kobi [facility]  [phone]  I put in the referral for Dr. Gibson- will send mychart message.

## 2025-07-31 ENCOUNTER — TELEPHONE (OUTPATIENT)
Dept: ORTHOPEDIC SURGERY | Age: 55
End: 2025-07-31

## (undated) DEVICE — MASC TURNOVER KIT: Brand: MEDLINE INDUSTRIES, INC.

## (undated) DEVICE — SUTURE PROL SZ 2-0 L18IN NONABSORBABLE BLU FS L26MM 3/8 CIR 8685H

## (undated) DEVICE — DEVON TUBE HOLDER REMOVABLE TOUCH FASTEN STRAP: Brand: DEVON

## (undated) DEVICE — GLOVE ORTHO 7 1/2   MSG9475

## (undated) DEVICE — APPLICATOR MEDICATED 26 CC SOLUTION HI LT ORNG CHLORAPREP

## (undated) DEVICE — HYPODERMIC SAFETY NEEDLE: Brand: MAGELLAN

## (undated) DEVICE — GAUZE,SPONGE,2"X2",8PLY,STERILE,LF,2'S: Brand: MEDLINE

## (undated) DEVICE — SHEET,DRAPE,53X77,STERILE: Brand: MEDLINE

## (undated) DEVICE — 4.5 MM FULL RADIUS ELITE STRAIGHT                                    DISPOSABLE BLADES, MAROON,PACKAGED 6                                    PER BOX, STERILE

## (undated) DEVICE — 3M™ STERI-DRAPE™ U-DRAPE 1015: Brand: STERI-DRAPE™

## (undated) DEVICE — SOLUTION IRRIG 3000ML 0.9% SOD CHL USP UROMATIC PLAS CONT

## (undated) DEVICE — 3M™ STERI-STRIP™ REINFORCED ADHESIVE SKIN CLOSURES, R1547, 1/2 IN X 4 IN (12 MM X 100 MM), 6 STRIPS/ENVELOPE: Brand: 3M™ STERI-STRIP™

## (undated) DEVICE — GLOVE ORANGE PI 7 1/2   MSG9075

## (undated) DEVICE — 3M™ TEGADERM™ TRANSPARENT FILM DRESSING FRAME STYLE, 1624W, 2-3/8 IN X 2-3/4 IN (6 CM X 7 CM), 100/CT 4CT/CASE: Brand: 3M™ TEGADERM™

## (undated) DEVICE — Device

## (undated) DEVICE — ZIMMER® STERILE DISPOSABLE TOURNIQUET CUFF WITH PLC, DUAL PORT, SINGLE BLADDER, 34 IN. (86 CM)

## (undated) DEVICE — SYRINGE MED 50ML LUERLOCK TIP

## (undated) DEVICE — DYONICS 25 PATIENT TUBE SET MUST                                    BE USED WITH 7211007, 12 PER BOX